# Patient Record
Sex: MALE | Race: WHITE | NOT HISPANIC OR LATINO | Employment: OTHER | URBAN - METROPOLITAN AREA
[De-identification: names, ages, dates, MRNs, and addresses within clinical notes are randomized per-mention and may not be internally consistent; named-entity substitution may affect disease eponyms.]

---

## 2017-09-15 ENCOUNTER — TRANSCRIBE ORDERS (OUTPATIENT)
Dept: LAB | Age: 82
End: 2017-09-15

## 2017-09-15 ENCOUNTER — APPOINTMENT (OUTPATIENT)
Dept: LAB | Age: 82
End: 2017-09-15
Payer: MEDICARE

## 2017-09-15 DIAGNOSIS — E78.00 PURE HYPERCHOLESTEROLEMIA: ICD-10-CM

## 2017-09-15 DIAGNOSIS — I48.20 CHRONIC ATRIAL FIBRILLATION (HCC): Primary | ICD-10-CM

## 2017-09-15 DIAGNOSIS — E03.9 UNSPECIFIED HYPOTHYROIDISM: ICD-10-CM

## 2017-09-15 DIAGNOSIS — E83.41 HYPERMAGNESEMIA: ICD-10-CM

## 2017-09-15 LAB
ALBUMIN SERPL BCP-MCNC: 3.4 G/DL (ref 3.5–5)
ALP SERPL-CCNC: 101 U/L (ref 46–116)
ALT SERPL W P-5'-P-CCNC: 17 U/L (ref 12–78)
ANION GAP SERPL CALCULATED.3IONS-SCNC: 6 MMOL/L (ref 4–13)
AST SERPL W P-5'-P-CCNC: 13 U/L (ref 5–45)
BILIRUB SERPL-MCNC: 1.4 MG/DL (ref 0.2–1)
BUN SERPL-MCNC: 22 MG/DL (ref 5–25)
CALCIUM SERPL-MCNC: 9 MG/DL (ref 8.3–10.1)
CHLORIDE SERPL-SCNC: 106 MMOL/L (ref 100–108)
CHOLEST SERPL-MCNC: 120 MG/DL (ref 50–200)
CO2 SERPL-SCNC: 27 MMOL/L (ref 21–32)
CREAT SERPL-MCNC: 1.39 MG/DL (ref 0.6–1.3)
GFR SERPL CREATININE-BSD FRML MDRD: 45 ML/MIN/1.73SQ M
GLUCOSE P FAST SERPL-MCNC: 105 MG/DL (ref 65–99)
HDLC SERPL-MCNC: 31 MG/DL (ref 40–60)
INR PPP: 2.43 (ref 0.86–1.16)
LDLC SERPL CALC-MCNC: 61 MG/DL (ref 0–100)
MAGNESIUM SERPL-MCNC: 2.5 MG/DL (ref 1.6–2.6)
POTASSIUM SERPL-SCNC: 5.1 MMOL/L (ref 3.5–5.3)
PROT SERPL-MCNC: 6.8 G/DL (ref 6.4–8.2)
PROTHROMBIN TIME: 26.7 SECONDS (ref 12.1–14.4)
SODIUM SERPL-SCNC: 139 MMOL/L (ref 136–145)
TRIGL SERPL-MCNC: 138 MG/DL
TSH SERPL DL<=0.05 MIU/L-ACNC: 3.87 UIU/ML (ref 0.36–3.74)

## 2017-09-15 PROCEDURE — 80061 LIPID PANEL: CPT

## 2017-09-15 PROCEDURE — 85610 PROTHROMBIN TIME: CPT

## 2017-09-15 PROCEDURE — 83735 ASSAY OF MAGNESIUM: CPT

## 2017-09-15 PROCEDURE — 84443 ASSAY THYROID STIM HORMONE: CPT

## 2017-09-15 PROCEDURE — 80053 COMPREHEN METABOLIC PANEL: CPT

## 2017-09-15 PROCEDURE — 36415 COLL VENOUS BLD VENIPUNCTURE: CPT

## 2017-10-02 ENCOUNTER — TRANSCRIBE ORDERS (OUTPATIENT)
Dept: LAB | Age: 82
End: 2017-10-02

## 2017-10-02 ENCOUNTER — APPOINTMENT (OUTPATIENT)
Dept: LAB | Age: 82
End: 2017-10-02
Payer: MEDICARE

## 2017-10-02 DIAGNOSIS — Z79.01 LONG TERM (CURRENT) USE OF ANTICOAGULANTS: ICD-10-CM

## 2017-10-02 DIAGNOSIS — I48.91 ATRIAL FIBRILLATION, UNSPECIFIED TYPE (HCC): Primary | ICD-10-CM

## 2017-10-02 DIAGNOSIS — N17.9 ACUTE KIDNEY FAILURE, UNSPECIFIED (HCC): ICD-10-CM

## 2017-10-02 LAB
ANION GAP SERPL CALCULATED.3IONS-SCNC: 7 MMOL/L (ref 4–13)
BUN SERPL-MCNC: 19 MG/DL (ref 5–25)
CALCIUM SERPL-MCNC: 8.9 MG/DL (ref 8.3–10.1)
CHLORIDE SERPL-SCNC: 106 MMOL/L (ref 100–108)
CO2 SERPL-SCNC: 25 MMOL/L (ref 21–32)
CREAT SERPL-MCNC: 1.25 MG/DL (ref 0.6–1.3)
GFR SERPL CREATININE-BSD FRML MDRD: 51 ML/MIN/1.73SQ M
GLUCOSE P FAST SERPL-MCNC: 108 MG/DL (ref 65–99)
INR PPP: 3.21 (ref 0.86–1.16)
POTASSIUM SERPL-SCNC: 4.4 MMOL/L (ref 3.5–5.3)
PROTHROMBIN TIME: 33.3 SECONDS (ref 12.1–14.4)
SODIUM SERPL-SCNC: 138 MMOL/L (ref 136–145)

## 2017-10-02 PROCEDURE — 85610 PROTHROMBIN TIME: CPT

## 2017-10-02 PROCEDURE — 80048 BASIC METABOLIC PNL TOTAL CA: CPT

## 2017-10-02 PROCEDURE — 36415 COLL VENOUS BLD VENIPUNCTURE: CPT

## 2018-04-06 ENCOUNTER — TRANSCRIBE ORDERS (OUTPATIENT)
Dept: LAB | Age: 83
End: 2018-04-06

## 2018-04-06 ENCOUNTER — APPOINTMENT (OUTPATIENT)
Dept: LAB | Age: 83
End: 2018-04-06
Payer: MEDICARE

## 2018-04-06 DIAGNOSIS — Z79.01 LONG TERM (CURRENT) USE OF ANTICOAGULANTS: Primary | ICD-10-CM

## 2018-04-06 DIAGNOSIS — I48.0 PAROXYSMAL ATRIAL FIBRILLATION (HCC): ICD-10-CM

## 2018-04-06 LAB
INR PPP: 2.41 (ref 0.86–1.16)
PROTHROMBIN TIME: 26.5 SECONDS (ref 12.1–14.4)

## 2018-04-06 PROCEDURE — 85610 PROTHROMBIN TIME: CPT

## 2018-04-06 PROCEDURE — 36415 COLL VENOUS BLD VENIPUNCTURE: CPT

## 2018-04-18 ENCOUNTER — OFFICE VISIT (OUTPATIENT)
Dept: URGENT CARE | Age: 83
End: 2018-04-18
Payer: MEDICARE

## 2018-04-18 DIAGNOSIS — M25.542 ARTHRALGIA OF HANDS, BILATERAL: Primary | ICD-10-CM

## 2018-04-18 DIAGNOSIS — M25.541 ARTHRALGIA OF HANDS, BILATERAL: Primary | ICD-10-CM

## 2018-04-18 PROCEDURE — 99213 OFFICE O/P EST LOW 20 MIN: CPT | Performed by: FAMILY MEDICINE

## 2018-04-18 PROCEDURE — G0463 HOSPITAL OUTPT CLINIC VISIT: HCPCS | Performed by: FAMILY MEDICINE

## 2018-04-18 RX ORDER — WARFARIN SODIUM 5 MG/1
TABLET ORAL
COMMUNITY
Start: 2017-09-18 | End: 2018-10-17 | Stop reason: SDUPTHER

## 2018-04-18 RX ORDER — METOPROLOL TARTRATE 50 MG/1
75 TABLET, FILM COATED ORAL
COMMUNITY
End: 2018-10-17 | Stop reason: SDUPTHER

## 2018-04-18 RX ORDER — ASPIRIN 81 MG/1
81 TABLET, CHEWABLE ORAL DAILY
COMMUNITY
Start: 2014-06-02

## 2018-04-18 RX ORDER — AMLODIPINE BESYLATE AND ATORVASTATIN CALCIUM 2.5; 1 MG/1; MG/1
1 TABLET, FILM COATED ORAL DAILY
COMMUNITY
End: 2018-10-17 | Stop reason: SDUPTHER

## 2018-04-18 RX ORDER — LEVOTHYROXINE SODIUM 0.1 MG/1
100 TABLET ORAL DAILY
COMMUNITY
End: 2018-10-17 | Stop reason: SDUPTHER

## 2018-04-18 RX ORDER — GABAPENTIN 300 MG/1
100 CAPSULE ORAL 3 TIMES DAILY
COMMUNITY
End: 2018-07-17 | Stop reason: ALTCHOICE

## 2018-04-18 RX ORDER — BENAZEPRIL HYDROCHLORIDE 20 MG/1
20 TABLET ORAL DAILY
COMMUNITY
Start: 2014-06-02 | End: 2018-10-17 | Stop reason: SDUPTHER

## 2018-04-18 RX ORDER — METHYLPREDNISOLONE 4 MG/1
TABLET ORAL
Qty: 21 TABLET | Refills: 0 | Status: SHIPPED | OUTPATIENT
Start: 2018-04-18 | End: 2018-07-17 | Stop reason: ALTCHOICE

## 2018-04-18 NOTE — PROGRESS NOTES
[address]        NAME: Jesus Lozano is a 80 y o  male  : 1928    MRN: [id number]  DATE: 2018  TIME: 1:37 PM    Assessment and Plan   Arthralgia of hands, bilateral [M25 541, M25 542]  1  Arthralgia of hands, bilateral  Methylprednisolone 4 MG TBPK         Patient Instructions     Patient Instructions   1  Get lab results from [place] provider to take with you to rheumatologist   2  May use Tylenol, 2 tablets every 4-6 hours as needed  (max of 10 tablets per day)  3  Continue warm compresses to hands for comfort  4  Take short course of Prednisone as directed  5  Take photos of swollen hands for documentation  6  Follow with rheumatologist at upcoming appt  Chief Complaint     Chief Complaint   Patient presents with    Hand Pain     x1 month swollen hands         History of Present Illness   Jesus Lozano presents to the clinic c/o    68-year-old RHD male with bilateral hand pain in joints and wrist with swelling for the last several weeks  He has been wearing compression gloves for some comfort but still pretty miserable  He has an appointment in May to see a rheumatologist but thought may be something could be done before then to decrease his discomfort  Swelling is across MCPs and into wrists  Worse in morning  Bilateral   Takes several hours to loosen hands up  He spent winter in [facility] and went to PCP down there for evaluation of hand pain approx  1 month ago  Evidently she took X-rays of hands and also had blood work done  According to patient and significant other, no treatment was offered at that time  At some point while down there, he was started on Vit D and levothyroxine  Once back to [place] he went to his PCP here and was given Rx for Prednisone  This helped  Used some sort of joint cream, oil of marijuana  Warmth helps with pain  Aching, constant with occasional jolts  Review of Systems   Review of Systems   Constitutional: Negative  Respiratory: Negative  Cardiovascular: Negative  History of paroxysmal atrial fibrillation   Musculoskeletal: Positive for arthralgias and joint swelling  Skin: Negative  Skin changes from warfarin use          Current Medications     Long-Term Prescriptions   Medication Sig Dispense Refill    amLODIPine-atorvastatin (CADUET) 2 5-10 MG per tablet Take 1 tablet by mouth daily      aspirin 81 mg chewable tablet Chew 81 mg      benazepril (LOTENSIN) 20 mg tablet Take 10 mg by mouth      gabapentin (NEURONTIN) 300 mg capsule Take 100 mg by mouth 3 (three) times a day      levothyroxine 100 mcg tablet Take 100 mcg by mouth daily      warfarin (COUMADIN) 5 mg tablet Take 1 to 1 1/2 tablets by mouth daily or as directed   metoprolol tartrate (LOPRESSOR) 50 mg tablet Take 75 mg by mouth         Current Allergies     Allergies as of 04/18/2018 - Reviewed 04/18/2018   Allergen Reaction Noted    Penicillins Blisters             The following portions of the patient's history were reviewed and updated as appropriate: allergies, current medications, past family history, past medical history, past social history, past surgical history and problem list     Objective   There were no vitals taken for this visit  Physical Exam     Physical Exam   Constitutional: He is oriented to person, place, and time  He appears well-developed and well-nourished  No distress  HENT:   Head: Normocephalic and atraumatic  Neck: Normal range of motion  Neck supple  Cardiovascular: Normal rate and regular rhythm  Murmur heard  Pulmonary/Chest: Effort normal    Musculoskeletal:   B/l swelling MCPs  No heat, redness  NTTP  Pain with AROM wrists  LROM at [address]  Decreased  due to pain  Neurological: He is alert and oriented to person, place, and time  Skin: Skin is warm and dry  He is not diaphoretic  No erythema  Psychiatric: He has a normal mood and affect     Nursing note and vitals reviewed

## 2018-07-17 ENCOUNTER — OFFICE VISIT (OUTPATIENT)
Dept: UROLOGY | Facility: CLINIC | Age: 83
End: 2018-07-17
Payer: MEDICARE

## 2018-07-17 VITALS
WEIGHT: 240 LBS | HEIGHT: 72 IN | BODY MASS INDEX: 32.51 KG/M2 | SYSTOLIC BLOOD PRESSURE: 148 MMHG | HEART RATE: 64 BPM | DIASTOLIC BLOOD PRESSURE: 76 MMHG

## 2018-07-17 DIAGNOSIS — N31.9 NEUROMUSCULAR DYSFUNCTION OF BLADDER: ICD-10-CM

## 2018-07-17 DIAGNOSIS — C61 MALIGNANT NEOPLASM OF PROSTATE (HCC): Primary | ICD-10-CM

## 2018-07-17 LAB
SL AMB  POCT GLUCOSE, UA: NORMAL
SL AMB LEUKOCYTE ESTERASE,UA: NORMAL
SL AMB POCT BILIRUBIN,UA: NORMAL
SL AMB POCT BLOOD,UA: NORMAL
SL AMB POCT CLARITY,UA: CLEAR
SL AMB POCT COLOR,UA: YELLOW
SL AMB POCT KETONES,UA: NORMAL
SL AMB POCT NITRITE,UA: NORMAL
SL AMB POCT PH,UA: 5
SL AMB POCT SPECIFIC GRAVITY,UA: 1.01
SL AMB POCT URINE PROTEIN: NORMAL
SL AMB POCT UROBILINOGEN: NORMAL

## 2018-07-17 PROCEDURE — 81002 URINALYSIS NONAUTO W/O SCOPE: CPT | Performed by: PHYSICIAN ASSISTANT

## 2018-07-17 PROCEDURE — 99213 OFFICE O/P EST LOW 20 MIN: CPT | Performed by: PHYSICIAN ASSISTANT

## 2018-07-17 NOTE — PROGRESS NOTES
UROLOGY PROGRESS NOTE   Patient Identifiers: Vinnie Shafer (MRN [id number])  Date of Service: 7/17/2018    Subjective:     31-year-old male history of  [place] 6 prostate cancer treated with radiation   In 2007  Prior cystoscopy had showed some regrowth  His last PSA was 0 6  He complains of some urinary frequency and leakage  He gets up 3 times a night and he uses several pads per day  Urine is clear  He has no burning or hematuria  No other complaints  Patient has    See above      Objective:     VITALS:    Vitals:    07/17/18 1435   BP: 148/76   Pulse: 64     AUA SYMPTOM SCORE      Most Recent Value   AUA SYMPTOM SCORE   How often have you had a sensation of not emptying your bladder completely after you finished urinating? 5   How often have you had to urinate again less than two hours after you finished urinating? 4   How often have you found you stopped and started again several times when you urinate? 3   How often have you found it difficult to postpone urination? 1   How often have you had a weak urinary stream?  2   How often have you had to push or strain to begin urination? 1   How many times did you most typically get up to urinate from the time you went to bed at night until the time you got up in the morning?   3   Quality of Life: If you were to spend the rest of your life with your urinary condition just the way it is now, how would you feel about that?  4   AUA SYMPTOM SCORE  19            LABS:  Lab Results   Component Value Date    HGB 15 5 06/10/2015    HCT 45 3 06/10/2015    WBC 5 68 06/10/2015     (L) 06/10/2015   ]    Lab Results   Component Value Date     10/02/2017    K 4 4 10/02/2017     10/02/2017    CO2 25 10/02/2017    BUN 19 10/02/2017    CREATININE 1 25 10/02/2017    CALCIUM 8 9 10/02/2017    GLUCOSE 108 07/28/2015   ]    INPATIENT MEDS:    Current Outpatient Prescriptions:     amLODIPine-atorvastatin (CADUET) 2 5-10 MG per tablet, Take 1 tablet by mouth daily, Disp: , Rfl:     aspirin 81 mg chewable tablet, Chew 81 mg, Disp: , Rfl:     benazepril (LOTENSIN) 20 mg tablet, Take 10 mg by mouth daily  , Disp: , Rfl:     levothyroxine 100 mcg tablet, Take 100 mcg by mouth daily, Disp: , Rfl:     metoprolol tartrate (LOPRESSOR) 50 mg tablet, Take 75 mg by mouth, Disp: , Rfl:     warfarin (COUMADIN) 5 mg tablet, Take 1 to 1 1/2 tablets by mouth daily or as directed , Disp: , Rfl:     B COMPLEX VITAMINS ER PO, Take 1 tablet by mouth, Disp: , Rfl:       Physical Exam:   /76 (Patient Position: Sitting, Cuff Size: Adult)   Pulse 64   Ht 6' (1 829 m)   Wt 109 kg (240 lb)   BMI 32 55 kg/m²   GEN: no acute distress    RESP: breathing comfortably with no accessory muscle use    ABD: soft, non-tender, non-distended   INCISION:    EXT: no significant peripheral edema   (Male): Penis circumcised, phallus normal, meatus patent  Testicles descended into scrotum bilaterally without masses nor tenderness  No inguinal hernias bilaterally  DEMARCO: Prostate is not enlarged at 28 grams  The prostate is not boggy  The prostate is not tender  No nodules noted    RADIOLOGY:    none     Assessment:    1  Prostate cancer   2   Voiding dysfunction     Plan:   - continue surveillance seen 1 year for annual exam   -  -  -

## 2018-08-15 ENCOUNTER — TELEPHONE (OUTPATIENT)
Dept: UROLOGY | Facility: CLINIC | Age: 83
End: 2018-08-15

## 2018-08-15 NOTE — TELEPHONE ENCOUNTER
Leeanna Santos called and asked to speak to Richard Cedeño about something he received in the mail regarding medicine for his prostate called Prostate XR he said if he sends away for it he will get 6 soft gels he is wondering if this will help with him getting some sleep/ Please Advise pt

## 2018-10-17 ENCOUNTER — OFFICE VISIT (OUTPATIENT)
Dept: INTERNAL MEDICINE CLINIC | Age: 83
End: 2018-10-17
Payer: MEDICARE

## 2018-10-17 VITALS
WEIGHT: 246.8 LBS | TEMPERATURE: 97.2 F | HEART RATE: 61 BPM | OXYGEN SATURATION: 98 % | HEIGHT: 70 IN | DIASTOLIC BLOOD PRESSURE: 72 MMHG | BODY MASS INDEX: 35.33 KG/M2 | SYSTOLIC BLOOD PRESSURE: 140 MMHG

## 2018-10-17 DIAGNOSIS — I10 ESSENTIAL HYPERTENSION: ICD-10-CM

## 2018-10-17 DIAGNOSIS — R26.2 AMBULATORY DYSFUNCTION: ICD-10-CM

## 2018-10-17 DIAGNOSIS — Z23 NEED FOR INFLUENZA VACCINATION: Primary | ICD-10-CM

## 2018-10-17 DIAGNOSIS — I48.91 ATRIAL FIBRILLATION, UNSPECIFIED TYPE (HCC): ICD-10-CM

## 2018-10-17 DIAGNOSIS — T50.905A ADVERSE EFFECT OF DRUG, INITIAL ENCOUNTER: ICD-10-CM

## 2018-10-17 DIAGNOSIS — R26.89 BALANCE DISORDER: ICD-10-CM

## 2018-10-17 DIAGNOSIS — E55.9 VITAMIN D DEFICIENCY: ICD-10-CM

## 2018-10-17 DIAGNOSIS — E78.2 MIXED HYPERLIPIDEMIA: ICD-10-CM

## 2018-10-17 DIAGNOSIS — E03.9 HYPOTHYROIDISM, UNSPECIFIED TYPE: ICD-10-CM

## 2018-10-17 DIAGNOSIS — E83.42 HYPOMAGNESEMIA: ICD-10-CM

## 2018-10-17 PROCEDURE — 99214 OFFICE O/P EST MOD 30 MIN: CPT | Performed by: INTERNAL MEDICINE

## 2018-10-17 RX ORDER — ATORVASTATIN CALCIUM 10 MG/1
10 TABLET, FILM COATED ORAL DAILY
Qty: 90 TABLET | Refills: 1 | Status: SHIPPED | OUTPATIENT
Start: 2018-10-17 | End: 2019-04-23 | Stop reason: SDUPTHER

## 2018-10-17 RX ORDER — WARFARIN SODIUM 5 MG/1
5 TABLET ORAL
Qty: 90 TABLET | Refills: 1 | Status: SHIPPED | OUTPATIENT
Start: 2018-10-17 | End: 2019-04-23 | Stop reason: SDUPTHER

## 2018-10-17 RX ORDER — AMLODIPINE BESYLATE 2.5 MG/1
TABLET ORAL
COMMUNITY
Start: 2018-08-24 | End: 2018-10-17 | Stop reason: SDUPTHER

## 2018-10-17 RX ORDER — BENAZEPRIL HYDROCHLORIDE 20 MG/1
20 TABLET ORAL DAILY
Qty: 90 TABLET | Refills: 1 | Status: SHIPPED | OUTPATIENT
Start: 2018-10-17 | End: 2019-04-23 | Stop reason: SDUPTHER

## 2018-10-17 RX ORDER — AMLODIPINE BESYLATE 2.5 MG/1
2.5 TABLET ORAL DAILY
Qty: 90 TABLET | Refills: 1 | Status: SHIPPED | OUTPATIENT
Start: 2018-10-17 | End: 2019-04-23 | Stop reason: SDUPTHER

## 2018-10-17 RX ORDER — LEVOTHYROXINE SODIUM 0.05 MG/1
50 TABLET ORAL DAILY
Qty: 90 TABLET | Refills: 1 | Status: SHIPPED | OUTPATIENT
Start: 2018-10-17 | End: 2019-04-23 | Stop reason: SDUPTHER

## 2018-10-17 RX ORDER — METOPROLOL TARTRATE 50 MG/1
75 TABLET, FILM COATED ORAL DAILY
Qty: 90 TABLET | Refills: 1 | Status: SHIPPED | OUTPATIENT
Start: 2018-10-17 | End: 2019-04-23 | Stop reason: SDUPTHER

## 2018-10-17 RX ORDER — MULTIVIT-MIN/IRON/FOLIC ACID/K 18-600-40
1 CAPSULE ORAL DAILY
Qty: 60 CAPSULE | Refills: 3 | Status: SHIPPED | OUTPATIENT
Start: 2018-10-17

## 2018-10-17 NOTE — PROGRESS NOTES
Assessment/Plan:    1  Hypertension  His BP today at the office was slightly elevated at 140/72  He is currently taking Lopressor, Lotensin, and Caduet  He will discontinue his Caduet and use Amlodipine 2 5 mg daily  He was advised to decrease his salt and caffeine in his diet  He will have an electrolyte panel, a CMP, and magnesium level drawn prior to his next appointment  2  BLE edema  He was advised to decrease salt in his diet as that will improve his leg edema  He should also use compression stockings daily and elevate his legs as much as possible for the swelling  3  Vitamin D Deficiency  He has a vitamin D deficiency and was given 37572 IU weekly for 8 weeks in April  He was instructed to continue to take 2000 IU daily and he will have a Vitamin D level drawn at his earliest convenience  4  Hypothyroidism  He has a history of hypothyroidism  He will take 50 mcg of Levothyroxine daily  He will have his TSH and T4 level drawn prior to his next appointment  5  Hyperlipidemia  He has a history of hyperlipidemia  He is currently taking 10 mg of atorvastatin and was instructed to discontinue Rosuvastatin  He will have a lipid panel and a liver function test drawn at his earliest convenience  6  Dysphagia  He reports he has dysphagia even when he chews his food finely  He was advised to double swallow to avoid aspiration and to give him ease in swallowing  He has had tests completed with his GI specialist and follows this with him  7  Weight loss  He was advised to minimize processed foods and increase natural foods in his diet  He was encouraged to minimize his white starches, carbohydrates, and concentrated sugars from his diet  8  Constipation  He states he has some constipation for which he takes an occasional laxative  He was instructed to discontinue the laxative and instead use Miralax daily  9  Sleep Apnea  He reports he has sleep apnea and uses a CPAP machine   He states he has not cleaned his machine and was advised to do that as that might improve his rhinorrhea and with PND  10  Ambulatory dysfunction  He uses a cane for ambulation  He wanted a walker as he feels he can still lose his balance with the cane  He also has pain in his knees  He was given a script for a sitting walker as he is at a risk for falls and has difficulty ambulation  He can apply Lidocaine 4% cream topically on his knee for pain relief  11  Joint stiffness  He reports he has joint stiffness and MCP swelling  He states his left 5th digit locks up  His fists swelled bilaterally 3 times and his previous physician gave him Prednisone each time which reduced this swelling each time  He was advised to use Tylenol for pain as needed  He was advised to use a paraffin bath to relieve swelling and pain in his hands  12  Healthcare Maintenance  He had the influenza vaccine at the [facility] on 10/2/18  He is unsure of whether he had his pneumonia vaccines so he will check his medical records and update us  He will follow up with us in 3 months or after he returns from [place]  Diagnoses and all orders for this visit:    Need for influenza vaccination  -     Cancel: influenza vaccine, [id number], high-dose, PF 0 5 mL, for patients 65 yr+ (FLUZONE HIGH-DOSE)    Ambulatory dysfunction  -     Walker    Balance disorder  -     Walker    Essential hypertension  -     amLODIPine (NORVASC) 2 5 mg tablet; Take 1 tablet (2 5 mg total) by mouth daily  -     metoprolol tartrate (LOPRESSOR) 50 mg tablet; Take 1 5 tablets (75 mg total) by mouth daily  -     benazepril (LOTENSIN) 20 mg tablet; Take 1 tablet (20 mg total) by mouth daily  -     Comprehensive metabolic panel; Future  -     Electrolyte panel; Future    Mixed hyperlipidemia  -     atorvastatin (LIPITOR) 10 mg tablet; Take 1 tablet (10 mg total) by mouth daily  -     Lipid panel;  Future    Atrial fibrillation, unspecified type (HCC)  -     warfarin (COUMADIN) 5 mg tablet; Take 1 tablet (5 mg total) by mouth daily  -     Comprehensive metabolic panel; Future    Hypothyroidism, unspecified type  -     levothyroxine 50 mcg tablet; Take 1 tablet (50 mcg total) by mouth daily  -     TSH, 3rd generation with Free T4 reflex; Future  -     T4, free; Future    Hypomagnesemia  -     magnesium oxide (MAG-OX) 400 mg; Take 1 tablet (400 mg total) by mouth 2 (two) times a day  -     Magnesium; Future    Adverse effect of drug, initial encounter  -     Hepatic function panel; Future    Vitamin D deficiency  -     Vitamin D 25 hydroxy; Future  -     Cholecalciferol (VITAMIN D) 2000 units CAPS; Take 1 capsule (2,000 Units total) by mouth daily    Other orders  -     Discontinue: amLODIPine (NORVASC) 2 5 mg tablet;           Subjective:      Patient ID: Dom Urbina is a 80 y o  male  Betty Woody is a 80year old male who presents today as a new patient to establish care at our practice  He has a history of hyperlipidemia, hypertension, hypothyroidism, and Vitamin B12 deficiency  He has no cartilage in his right knee and he has pain in his right hip all the way to his right knee  He states his left knee replacement surgery was completed 12 years ago  He has seen an orthopedic who has drained the knee and injected it, which he states helped him for 1-2 months  He reports he has sleep apnea and uses a CPAP machine  He reports his eyes water and he has rhinorrhea with PND  He reports he has hearing loss for which he wears hearing aids  He reports he has dysphagia even when he chews his food finely  He reports he has "stabbing sensations" in his chest occasionally which he wonders what that is  He has had open heart surgery and has Afib for which he takes Coumadin  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 Marcie Waite  He was given radiation therapy for this  He states he has a history of prostate cancer which now causes him up to 3 episodes of nocturia nightly   He follows with Dr Larisa Jalloh, urology, regularly for his urinary problems  He reports he has joint stiffness and MCP swelling  He states his left 5th digit "locks up"  His fists swelled bilaterally 3 times and his previous physician gave him Prednisone each time which reduced this swelling each time  He states he has some constipation for which he takes an occasional laxative  He reports he has gained 10 lbs in the last few months  He reports many skin lesions and skin tags  He has a history of Vitamin D deficiency  The following portions of the patient's history were reviewed and updated as appropriate: allergies, current medications, past family history, past medical history, past social history, past surgical history and problem list     Review of Systems   Constitutional: Positive for unexpected weight change (wt  gain)  Negative for diaphoresis, fatigue and fever  HENT: Positive for hearing loss, postnasal drip, rhinorrhea and trouble swallowing (caught in throat)  Negative for congestion, sinus pressure, sneezing, sore throat, tinnitus and voice change  Nosebleeds: hearing aids  Eyes: Positive for discharge  Respiratory: Positive for choking  Negative for cough, chest tightness, shortness of breath, wheezing and stridor  Cardiovascular: Positive for chest pain (had open heart surgery and AF , 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  had radiation therapy ), palpitations (af) and leg swelling  Stabbing sensation in chest   Gastrointestinal: Positive for constipation  Negative for abdominal distention, abdominal pain, anal bleeding, blood in stool, diarrhea, nausea, rectal pain and vomiting  Genitourinary: Negative for difficulty urinating, flank pain and hematuria  Nocturia   Musculoskeletal: Positive for arthralgias (needs rt  knee replacement ), back pain and gait problem (buttock pain)  Negative for neck pain and neck stiffness     Skin: Negative for color change, pallor, rash and wound    Neurological: Negative for dizziness, speech difficulty, light-headedness, numbness and headaches  Hematological: Negative for adenopathy  Psychiatric/Behavioral: Negative  Objective:      /72 (BP Location: Left arm, Patient Position: Sitting, Cuff Size: Large)   Pulse 61   Temp (!) 97 2 °F (36 2 °C) (Tympanic)   Ht 5' 10 28" (1 785 m)   Wt 112 kg (246 lb 12 8 oz)   SpO2 98%   BMI 35 13 kg/m²          Physical Exam   Constitutional: He is oriented to person, place, and time  He appears well-developed and well-nourished  No distress  HENT:   Head: Normocephalic and atraumatic  Eyes: Conjunctivae and EOM are normal  Right eye exhibits no discharge  Left eye exhibits no discharge  No scleral icterus  Neck: Normal range of motion  Neck supple  Cardiovascular: Normal rate and regular rhythm  Murmur heard  Pulmonary/Chest: Effort normal and breath sounds normal  No respiratory distress  He has no wheezes  Abdominal: Soft  Bowel sounds are normal  He exhibits no distension  There is no tenderness  There is no rebound  Musculoskeletal: He exhibits edema and tenderness  He exhibits no deformity  Neurological: He is oriented to person, place, and time  He has normal reflexes  Skin: He is not diaphoretic  There is erythema  Psychiatric: He has a normal mood and affect  His behavior is normal  Judgment and thought content normal    Vitals reviewed  Scribe Signature and Attestation:  By signing my name below, India Dunn attest that this documentation has been prepared under the direction and in the presence of Dr Geoffrey Ortiz MD  Electronically signed: Cassidy Amaya  10/17/18    Provider Attestation:  I, Dr Geoffrey Ortiz, personally performed the services described in this documentation  All medical record entries made by the scribadis were at my direction and in my presence   I have reviewed the chart and discharge instructions (if applicable) and agree that the record reflects my personal performance and is accurate and complete  Dr Lexy Little MD  10/17/18

## 2018-10-17 NOTE — PATIENT INSTRUCTIONS
1  He will discontinue his Caduet and use Amlodipine 2 5 mg daily  He was advised to decrease his salt and caffeine in his diet as that will improve his BP and his leg swelling  2  He will have an electrolyte panel, a CMP, and magnesium level drawn at his earliest convenience  3  He should also use compression stockings daily and elevate his legs as much as possible for the swelling  4  He was instructed to continue to take 2000 IU daily and he will have a Vitamin D level drawn at his earliest convenience  5  He will take 50 mcg of Levothyroxine daily  He will have his TSH and T4 level drawn at his earliest convenience  6  He is currently taking 10 mg of atorvastatin and was instructed to discontinue Rosuvastatin  7  He will have a lipid panel and a liver function test drawn at his earliest convenience  8  He was advised to double swallow to avoid aspiration and to give him ease in swallowing  9  He was advised to minimize processed foods and increase natural foods in his diet  He was encouraged to minimize his white starches, carbohydrates, and concentrated sugars from his diet  10  He was instructed to discontinue laxatives and instead use Miralax daily for his constipation  11  He was advised to clean his CPAP machine as that might improve his rhinorrhea and with PND  12  He can apply Lidocaine 4% cream topically on his knee for pain relief  13  He was advised to use Tylenol for his hand pain as needed  He was advised to use a paraffin bath to relieve swelling and pain in his hands  15  He is unsure of whether he had his pneumonia vaccines so he will check his medical records and update us  15  He will follow up with us in 3 months or after he returns from [place]

## 2018-10-24 ENCOUNTER — APPOINTMENT (OUTPATIENT)
Dept: RADIOLOGY | Age: 83
End: 2018-10-24
Payer: MEDICARE

## 2018-10-24 ENCOUNTER — OFFICE VISIT (OUTPATIENT)
Dept: INTERNAL MEDICINE CLINIC | Facility: CLINIC | Age: 83
End: 2018-10-24
Payer: MEDICARE

## 2018-10-24 VITALS
SYSTOLIC BLOOD PRESSURE: 150 MMHG | HEART RATE: 71 BPM | TEMPERATURE: 98.1 F | HEIGHT: 70 IN | BODY MASS INDEX: 35.76 KG/M2 | OXYGEN SATURATION: 97 % | WEIGHT: 249.8 LBS | DIASTOLIC BLOOD PRESSURE: 60 MMHG

## 2018-10-24 DIAGNOSIS — M54.9 OTHER ACUTE BACK PAIN: ICD-10-CM

## 2018-10-24 DIAGNOSIS — Z91.81 STATUS POST FALL: ICD-10-CM
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
## 2018-10-24 LAB
SL AMB  POCT GLUCOSE, UA: NEGATIVE
SL AMB LEUKOCYTE ESTERASE,UA: NEGATIVE
SL AMB POCT BILIRUBIN,UA: NEGATIVE
SL AMB POCT BLOOD,UA: NEGATIVE
SL AMB POCT CLARITY,UA: CLEAR
SL AMB POCT COLOR,UA: YELLOW
SL AMB POCT KETONES,UA: NEGATIVE
SL AMB POCT NITRITE,UA: NEGATIVE
SL AMB POCT PH,UA: 5.5
SL AMB POCT SPECIFIC GRAVITY,UA: 1.02
SL AMB POCT URINE PROTEIN: 30
SL AMB POCT UROBILINOGEN: 0.2

## 2018-10-24 PROCEDURE — 71111 X-RAY EXAM RIBS/CHEST4/> VWS: CPT

## 2018-10-24 PROCEDURE — 72100 X-RAY EXAM L-S SPINE 2/3 VWS: CPT

## 2018-10-24 PROCEDURE — 99214 OFFICE O/P EST MOD 30 MIN: CPT | Performed by: INTERNAL MEDICINE

## 2018-10-24 PROCEDURE — 81003 URINALYSIS AUTO W/O SCOPE: CPT | Performed by: INTERNAL MEDICINE

## 2018-10-24 RX ORDER — TRAMADOL HYDROCHLORIDE 50 MG/1
50 TABLET ORAL EVERY 6 HOURS PRN
Qty: 15 TABLET | Refills: 0 | Status: SHIPPED | OUTPATIENT
Start: 2018-10-24 | End: 2019-04-23 | Stop reason: CLARIF

## 2018-10-24 NOTE — PROGRESS NOTES
Assessment/Plan:    1  Status post fall  He reports he fell while trying to climb into a car 33 days ago  As he was stepping in to a high SUV car, he lost balance and fell backwards on his left side onto a rounded curb  He states he has a lot of pain and has trouble laying down and walking  He especially has problems walking due to more pain in the left leg  He states all his pain is in the back left side, and he gets a lot of pain He had pain when doing anterior/posterior rib compressions  He has tenderness around L3-L4  He had right SLT  His urinalysis done in the office today showed no hematuria, indicating that the kidney had no significant trauma during this fall  He will have XR of the spine and XR of the chest completed STAT  He can use Lidocaine 4% cream topically on his back for pain relief  In addition, he was given 15 tablets of tramadol 50 mg to use BID as needed for pain relief  In between the doses of the tramadol through the day, he can use Tylenol 500 mg 1 in the morning, 1 at lunch, and 2 at night, as well, for pain relief  He can also use a moist heating pad or ice therapy for pain relief  2  Healthcare Maintenance  He will follow up with us in 1 week or as needed  Diagnoses and all orders for this visit:    Fall, initial encounter  -     traMADol (ULTRAM) 50 mg tablet; Take 1 tablet (50 mg total) by mouth every 6 (six) hours as needed for moderate pain  -     XR spine lumbar 2 or 3 views injury; Future  -     XR ribs bilateral 4+ vw w pa chest; Future    Status post fall  -     traMADol (ULTRAM) 50 mg tablet; Take 1 tablet (50 mg total) by mouth every 6 (six) hours as needed for moderate pain  -     XR spine lumbar 2 or 3 views injury; Future  -     XR ribs bilateral 4+ vw w pa chest; Future    Other acute back pain  -     traMADol (ULTRAM) 50 mg tablet; Take 1 tablet (50 mg total) by mouth every 6 (six) hours as needed for moderate pain  -     XR spine lumbar 2 or 3 views injury;  Future  - XR ribs bilateral 4+ vw w pa chest; Future          Subjective:      Patient ID: Mar Carson is a 80 y o  male  Sterling Hardy is a 80year old male who presents today as a same day patient for evaluation of back pain status post fall 3 days ago  He reports he fell while trying to climb into a car  As he was stepping in to a high SUV car, he lost balance and fell backwards on his left side onto a rounded curb  He states he has  a lot of pain and has trouble laying down and walking  He especially has problems walking due to more pain in the left leg  He states all his pain is in the back left side  He reports he has some pain to touch on his RUQ  He states this is on his skin and he has followed this with a dermatologist who gave him an ointment which improved this skin condition  He reports he had constipation for 3 days, but after taking Miralax on Monday, he had a bowel movement  He states he will be flying at the end of next week to [place]  The following portions of the patient's history were reviewed and updated as appropriate: allergies, current medications, past family history, past medical history, past social history, past surgical history and problem list     Review of Systems   HENT: Positive for postnasal drip and rhinorrhea  Eyes: Negative for visual disturbance (no change)  Cardiovascular: Negative for chest pain and palpitations  Gastrointestinal: Positive for abdominal distention and constipation  Negative for abdominal pain, blood in stool and diarrhea  Skin sensitivity  Of skin  Around abdomen   Genitourinary: Negative for difficulty urinating, hematuria and urgency  Musculoskeletal: Positive for back pain and gait problem  Skin:        Dry skin   Neurological: Negative for dizziness, light-headedness, numbness and headaches  Fall from unusual  positioning   Psychiatric/Behavioral: Negative            Objective:      /60 (BP Location: Left arm, Patient Position: Sitting, Cuff Size: Adult)   Pulse 71   Temp 98 1 °F (36 7 °C) (Oral)   Ht 5' 10 28" (1 785 m)   Wt 113 kg (249 lb 12 8 oz)   SpO2 97%   BMI 35 56 kg/m²          Physical Exam   Constitutional: He appears well-developed and well-nourished  No distress  HENT:   Head: Normocephalic and atraumatic  Eyes: Conjunctivae and EOM are normal  Left eye exhibits no discharge  No scleral icterus  Neck: Normal range of motion  Neck supple  Cardiovascular: Normal rate and regular rhythm  Murmur heard  Pulmonary/Chest: He is in respiratory distress  He has no wheezes  He has no rales  He exhibits tenderness  Pain on lateral compression of the chest and also in ap pressure  Decreased BS   Abdominal: He exhibits distension  He exhibits no mass  There is tenderness  There is no rebound and no guarding  Musculoskeletal: He exhibits edema  He exhibits no deformity  Local back tenderness  L4-L5  Positive st   Leg tenderness  DTR'S are absent   Neurological: He displays abnormal reflex (reduced)  Skin: Skin is dry  No rash noted  He is not diaphoretic  Bruise noted on back   Psychiatric: He has a normal mood and affect  His behavior is normal  Judgment and thought content normal          Scribe Signature and Attestation:  By signing my name below, Aba Shrestha attest that this documentation has been prepared under the direction and in the presence of Dr Sanjay Aquino MD  Electronically signed: Cassidy Hollis  10/24/18    Provider Attestation:  I, Dr Sanjay Aquino, personally performed the services described in this documentation  All medical record entries made by the scribe were at my direction and in my presence  I have reviewed the chart and discharge instructions (if applicable) and agree that the record reflects my personal performance and is accurate and complete  Dr Sanjay Aquino MD  10/24/18

## 2018-10-24 NOTE — PATIENT INSTRUCTIONS
1  He will have XR of the spine and XR of the chest completed STAT  2  He can use Lidocaine 4% cream topically on his back for pain relief  3  In addition, he was given 15 tablets of tramadol 50 mg to use BID as needed for pain relief  4  In between the doses of the tramadol through the day, he can use Tylenol 500 mg 1 in the morning, 1 at lunch, and 2 at night, as well, for pain relief  5  He can also use a moist heating pad or ice therapy for pain relief  6  He will follow up with us in 1 week

## 2018-10-25 ENCOUNTER — APPOINTMENT (OUTPATIENT)
Dept: LAB | Age: 83
End: 2018-10-25
Payer: MEDICARE

## 2018-10-25 ENCOUNTER — OFFICE VISIT (OUTPATIENT)
Dept: INTERNAL MEDICINE CLINIC | Age: 83
End: 2018-10-25
Payer: MEDICARE

## 2018-10-25 VITALS
OXYGEN SATURATION: 96 % | HEART RATE: 57 BPM | WEIGHT: 252 LBS | DIASTOLIC BLOOD PRESSURE: 70 MMHG | TEMPERATURE: 97.3 F | HEIGHT: 70 IN | SYSTOLIC BLOOD PRESSURE: 142 MMHG | BODY MASS INDEX: 36.08 KG/M2

## 2018-10-25 DIAGNOSIS — I10 ESSENTIAL HYPERTENSION: ICD-10-CM

## 2018-10-25 DIAGNOSIS — E83.42 HYPOMAGNESEMIA: ICD-10-CM

## 2018-10-25 DIAGNOSIS — M54.5 ACUTE LEFT-SIDED LOW BACK PAIN, WITH SCIATICA PRESENCE UNSPECIFIED: Primary | ICD-10-CM

## 2018-10-25 DIAGNOSIS — I48.91 ATRIAL FIBRILLATION, UNSPECIFIED TYPE (HCC): ICD-10-CM

## 2018-10-25 DIAGNOSIS — E55.9 VITAMIN D DEFICIENCY: ICD-10-CM

## 2018-10-25 DIAGNOSIS — T50.905A ADVERSE EFFECT OF DRUG, INITIAL ENCOUNTER: ICD-10-CM

## 2018-10-25 DIAGNOSIS — E03.9 HYPOTHYROIDISM, UNSPECIFIED TYPE: ICD-10-CM

## 2018-10-25 DIAGNOSIS — E78.2 MIXED HYPERLIPIDEMIA: ICD-10-CM

## 2018-10-25 LAB
25(OH)D3 SERPL-MCNC: 27.7 NG/ML (ref 30–100)
ALBUMIN SERPL BCP-MCNC: 3.4 G/DL (ref 3.5–5)
ALP SERPL-CCNC: 103 U/L (ref 46–116)
ALT SERPL W P-5'-P-CCNC: 19 U/L (ref 12–78)
ANION GAP SERPL CALCULATED.3IONS-SCNC: 8 MMOL/L (ref 4–13)
AST SERPL W P-5'-P-CCNC: 16 U/L (ref 5–45)
BILIRUB DIRECT SERPL-MCNC: 0.34 MG/DL (ref 0–0.2)
BILIRUB SERPL-MCNC: 1.63 MG/DL (ref 0.2–1)
BUN SERPL-MCNC: 23 MG/DL (ref 5–25)
CALCIUM SERPL-MCNC: 8.9 MG/DL (ref 8.3–10.1)
CHLORIDE SERPL-SCNC: 106 MMOL/L (ref 100–108)
CHOLEST SERPL-MCNC: 116 MG/DL (ref 50–200)
CO2 SERPL-SCNC: 25 MMOL/L (ref 21–32)
CREAT SERPL-MCNC: 1.35 MG/DL (ref 0.6–1.3)
GFR SERPL CREATININE-BSD FRML MDRD: 46 ML/MIN/1.73SQ M
GLUCOSE P FAST SERPL-MCNC: 109 MG/DL (ref 65–99)
HDLC SERPL-MCNC: 34 MG/DL (ref 40–60)
LDLC SERPL CALC-MCNC: 62 MG/DL (ref 0–100)
MAGNESIUM SERPL-MCNC: 2.3 MG/DL (ref 1.6–2.6)
NONHDLC SERPL-MCNC: 82 MG/DL
POTASSIUM SERPL-SCNC: 4.3 MMOL/L (ref 3.5–5.3)
PROT SERPL-MCNC: 6.6 G/DL (ref 6.4–8.2)
SODIUM SERPL-SCNC: 139 MMOL/L (ref 136–145)
T4 FREE SERPL-MCNC: 1.09 NG/DL (ref 0.76–1.46)
TRIGL SERPL-MCNC: 102 MG/DL
TSH SERPL DL<=0.05 MIU/L-ACNC: 5.65 UIU/ML (ref 0.36–3.74)

## 2018-10-25 PROCEDURE — 82306 VITAMIN D 25 HYDROXY: CPT

## 2018-10-25 PROCEDURE — 80053 COMPREHEN METABOLIC PANEL: CPT

## 2018-10-25 PROCEDURE — 83735 ASSAY OF MAGNESIUM: CPT

## 2018-10-25 PROCEDURE — 99212 OFFICE O/P EST SF 10 MIN: CPT | Performed by: INTERNAL MEDICINE

## 2018-10-25 PROCEDURE — 84439 ASSAY OF FREE THYROXINE: CPT

## 2018-10-25 PROCEDURE — 82248 BILIRUBIN DIRECT: CPT

## 2018-10-25 PROCEDURE — 80061 LIPID PANEL: CPT

## 2018-10-25 PROCEDURE — 36415 COLL VENOUS BLD VENIPUNCTURE: CPT

## 2018-10-25 PROCEDURE — 84443 ASSAY THYROID STIM HORMONE: CPT

## 2018-10-25 RX ORDER — PREDNISONE 10 MG/1
10 TABLET ORAL 2 TIMES DAILY WITH MEALS
Qty: 14 TABLET | Refills: 0 | Status: SHIPPED | OUTPATIENT
Start: 2018-10-25 | End: 2018-10-25 | Stop reason: ALTCHOICE

## 2018-10-25 NOTE — PATIENT INSTRUCTIONS
1  Put heat on areas of complaint for 15-20 minutes every few hours, do not go to bed with the heating pad  2  Should you experience increase in pain level, there is a prescription for Prednisone 20 mg to take for seven days that will help to reduce inflammation  Should you take this, watch your white starch and sugar intake as steroids can increase your sugar levels  3  You may split Tramadol tablets in 1/2 if the full dose causes dizziness

## 2018-10-25 NOTE — PROGRESS NOTES
Assessment/Plan:    1  Status post fall  Patient fell over a month ago while attempting to get into his sons car  He injured his left side and back  X-ray ribs on 10/24/18 did not show any pulmonary disease or evidence of rib fracture  X-ray spine on 10/24/18 did not reveal any fracture  X-ray images were viewed together with the patient and his wife in detail  Should his pain flare, I will provide him with Prednisone 20 mg tablets for 7 days  If he is doing better, he need not take the steroids  He was made aware that taking steroids can increase his sugar levels and he is to watch his white starch and sugar intake should he take this  He also has the Tramadol 50 mg and Lidocaine 4% topical cream that he is able to use PRN  He is able to break Tramadol in half should he be concerned about dizziness  Diagnoses and all orders for this visit:    Acute left-sided low back pain, with sciatica presence unspecified  -     Discontinue: predniSONE 10 mg tablet; Take 1 tablet (10 mg total) by mouth 2 (two) times a day with meals for 7 days (Patient not taking: Reported on 10/25/2018 )          Subjective:      Patient ID: Nerissa Og is a 80 y o  male  Dee Siegel is a 80year old male who presents today as a follow-up to discuss results of his X-ray ribs and spine status post fall  Patient fell over a month ago while trying to get into his son's car, and was seen in the office on 10/24/18  X-ray ribs on 10/24/18 did not show any pulmonary disease or evidence of rib fracture  X-ray spine on 10/24/18 did not reveal any fracture  He has been using the Tramadol and Lidocaine 4% topical cream as needed  He reports that he only took one Tramadol and that he is feeling much better compared to last week  He did experience one episode of lightheadedness after use of Tramadol  Should he and his wife plan to go on a flight to [place], instructions were given           The following portions of the patient's history were reviewed and updated as appropriate: allergies, current medications, past family history, past medical history, past social history, past surgical history and problem list     Review of Systems   Constitutional: Positive for activity change  Negative for diaphoresis, fatigue and fever  HENT: Negative for congestion  Respiratory: Negative for chest tightness  Gastrointestinal: Negative for abdominal distention, abdominal pain and blood in stool  Genitourinary: Negative for difficulty urinating  Musculoskeletal: Positive for arthralgias, back pain (much improved) and gait problem  Skin: Negative  Neurological: Positive for light-headedness (mild)  Negative for dizziness (mild and short lived with tramadol), tremors, seizures, syncope, speech difficulty and headaches  Psychiatric/Behavioral: Negative for agitation, confusion, decreased concentration, dysphoric mood and hallucinations  The patient is not hyperactive  Objective:      /70 (BP Location: Left arm, Patient Position: Sitting, Cuff Size: Standard)   Pulse 57   Temp (!) 97 3 °F (36 3 °C) (Tympanic)   Ht 5' 10 28" (1 785 m)   Wt 114 kg (252 lb)   SpO2 96%   BMI 35 87 kg/m²          Physical Exam   Constitutional: He is oriented to person, place, and time  Cardiovascular: Normal rate and regular rhythm  Abdominal: He exhibits no distension  Musculoskeletal: He exhibits edema  Still local tenderness in ribs and lower back   Neurological: He is alert and oriented to person, place, and time  Attestation:   By signing my name below, Jose Galeazzi, attest that this documentation has been prepared under the direction and in the presence of Bia Lunsford MD  Electronically Signed: Cassidy Ayala  10/25/18     I, Bia Lunsford, personally performed the services described in this documentation  All medical record entries made by the larissaibadis were at my direction and in my presence   I have reviewed the chart and discharge instructions and agree that the record reflects my personal performance and is accurate and complete    Yanci Villalobos MD  10/25/18

## 2018-11-20 ENCOUNTER — TELEPHONE (OUTPATIENT)
Dept: INTERNAL MEDICINE CLINIC | Age: 83
End: 2018-11-20

## 2018-11-20 NOTE — TELEPHONE ENCOUNTER
Patient lives in [place] for 6 months every year  He wants us to fax his medical records only from the past 6 months to his physician in [place]  Please include x-ray reports and labs  Dr Charlton Dancer Sugar  FAX:  [phone]  He has an appointment on 12/11/2018  Call his cell if any questions

## 2019-04-23 ENCOUNTER — OFFICE VISIT (OUTPATIENT)
Dept: INTERNAL MEDICINE CLINIC | Age: 84
End: 2019-04-23
Payer: MEDICARE

## 2019-04-23 VITALS
OXYGEN SATURATION: 98 % | HEIGHT: 70 IN | TEMPERATURE: 97.7 F | DIASTOLIC BLOOD PRESSURE: 48 MMHG | BODY MASS INDEX: 34.22 KG/M2 | WEIGHT: 239 LBS | SYSTOLIC BLOOD PRESSURE: 100 MMHG | HEART RATE: 53 BPM

## 2019-04-23 DIAGNOSIS — J34.89 RHINORRHEA: ICD-10-CM

## 2019-04-23 DIAGNOSIS — G47.33 OSA (OBSTRUCTIVE SLEEP APNEA): ICD-10-CM

## 2019-04-23 DIAGNOSIS — Z23 ENCOUNTER FOR IMMUNIZATION: ICD-10-CM

## 2019-04-23 DIAGNOSIS — I48.91 ATRIAL FIBRILLATION, UNSPECIFIED TYPE (HCC): ICD-10-CM

## 2019-04-23 DIAGNOSIS — H04.203 WATERY EYES: ICD-10-CM

## 2019-04-23 DIAGNOSIS — Z23 NEED FOR TDAP VACCINATION: ICD-10-CM

## 2019-04-23 DIAGNOSIS — Z88.9 H/O SEASONAL ALLERGIES: Primary | ICD-10-CM

## 2019-04-23 DIAGNOSIS — E78.2 MIXED HYPERLIPIDEMIA: ICD-10-CM

## 2019-04-23 DIAGNOSIS — I10 ESSENTIAL HYPERTENSION: ICD-10-CM

## 2019-04-23 DIAGNOSIS — E03.9 HYPOTHYROIDISM, UNSPECIFIED TYPE: ICD-10-CM

## 2019-04-23 PROCEDURE — 99214 OFFICE O/P EST MOD 30 MIN: CPT | Performed by: INTERNAL MEDICINE

## 2019-04-23 RX ORDER — ATORVASTATIN CALCIUM 10 MG/1
10 TABLET, FILM COATED ORAL DAILY
Qty: 90 TABLET | Refills: 1 | Status: SHIPPED | OUTPATIENT
Start: 2019-04-23 | End: 2019-10-22 | Stop reason: SDUPTHER

## 2019-04-23 RX ORDER — LEVOTHYROXINE SODIUM 0.05 MG/1
50 TABLET ORAL DAILY
Qty: 90 TABLET | Refills: 1 | Status: SHIPPED | OUTPATIENT
Start: 2019-04-23 | End: 2019-09-24

## 2019-04-23 RX ORDER — BENAZEPRIL HYDROCHLORIDE 10 MG/1
10 TABLET ORAL DAILY
Qty: 90 TABLET | Refills: 1 | Status: SHIPPED | OUTPATIENT
Start: 2019-04-23 | End: 2019-10-22 | Stop reason: SDUPTHER

## 2019-04-23 RX ORDER — AMLODIPINE BESYLATE 5 MG/1
5 TABLET ORAL DAILY
Qty: 90 TABLET | Refills: 1 | Status: SHIPPED | OUTPATIENT
Start: 2019-04-23 | End: 2019-10-22 | Stop reason: SDUPTHER

## 2019-04-23 RX ORDER — METOPROLOL TARTRATE 50 MG/1
75 TABLET, FILM COATED ORAL DAILY
Qty: 90 TABLET | Refills: 1 | Status: SHIPPED | OUTPATIENT
Start: 2019-04-23 | End: 2019-04-23

## 2019-04-23 RX ORDER — CETIRIZINE HYDROCHLORIDE 5 MG/1
5 TABLET ORAL DAILY
Qty: 90 TABLET | Refills: 1 | Status: CANCELLED | OUTPATIENT
Start: 2019-04-23

## 2019-04-23 RX ORDER — WARFARIN SODIUM 5 MG/1
5 TABLET ORAL
Qty: 90 TABLET | Refills: 1 | Status: SHIPPED | OUTPATIENT
Start: 2019-04-23 | End: 2019-10-22 | Stop reason: SDUPTHER

## 2019-04-23 RX ORDER — BENAZEPRIL HYDROCHLORIDE 20 MG/1
20 TABLET ORAL DAILY
Qty: 90 TABLET | Refills: 1 | Status: SHIPPED | OUTPATIENT
Start: 2019-04-23 | End: 2019-04-23 | Stop reason: SDUPTHER

## 2019-04-26 ENCOUNTER — TELEPHONE (OUTPATIENT)
Dept: INTERNAL MEDICINE CLINIC | Age: 84
End: 2019-04-26

## 2019-05-04 ENCOUNTER — TELEPHONE (OUTPATIENT)
Dept: INTERNAL MEDICINE CLINIC | Age: 84
End: 2019-05-04

## 2019-05-21 ENCOUNTER — TELEPHONE (OUTPATIENT)
Dept: INTERNAL MEDICINE CLINIC | Age: 84
End: 2019-05-21

## 2019-05-21 ENCOUNTER — OFFICE VISIT (OUTPATIENT)
Dept: INTERNAL MEDICINE CLINIC | Age: 84
End: 2019-05-21
Payer: MEDICARE

## 2019-05-21 ENCOUNTER — APPOINTMENT (OUTPATIENT)
Dept: RADIOLOGY | Age: 84
End: 2019-05-21
Payer: MEDICARE

## 2019-05-21 VITALS
OXYGEN SATURATION: 98 % | SYSTOLIC BLOOD PRESSURE: 114 MMHG | DIASTOLIC BLOOD PRESSURE: 72 MMHG | TEMPERATURE: 97.7 F | HEART RATE: 76 BPM

## 2019-05-21 DIAGNOSIS — J40 BRONCHITIS: Primary | ICD-10-CM

## 2019-05-21 DIAGNOSIS — C61 MALIGNANT NEOPLASM OF PROSTATE (HCC): ICD-10-CM

## 2019-05-21 DIAGNOSIS — J40 BRONCHITIS: ICD-10-CM

## 2019-05-21 PROCEDURE — 71046 X-RAY EXAM CHEST 2 VIEWS: CPT

## 2019-05-21 PROCEDURE — 99214 OFFICE O/P EST MOD 30 MIN: CPT | Performed by: INTERNAL MEDICINE

## 2019-05-21 RX ORDER — FLUTICASONE PROPIONATE 110 UG/1
2 AEROSOL, METERED RESPIRATORY (INHALATION) 2 TIMES DAILY
Qty: 1 INHALER | Refills: 1 | Status: SHIPPED | OUTPATIENT
Start: 2019-05-21 | End: 2019-05-24 | Stop reason: CLARIF

## 2019-05-21 RX ORDER — FEXOFENADINE HCL 180 MG/1
180 TABLET ORAL DAILY
Qty: 15 TABLET | Refills: 0 | Status: SHIPPED | OUTPATIENT
Start: 2019-05-21

## 2019-05-21 RX ORDER — FLUTICASONE PROPIONATE 50 MCG
1 SPRAY, SUSPENSION (ML) NASAL DAILY
Qty: 1 BOTTLE | Refills: 0 | Status: SHIPPED | OUTPATIENT
Start: 2019-05-21

## 2019-05-21 RX ORDER — BENZONATATE 100 MG/1
100 CAPSULE ORAL 3 TIMES DAILY PRN
Qty: 20 CAPSULE | Refills: 0 | OUTPATIENT
Start: 2019-05-21 | End: 2019-05-22 | Stop reason: SDUPTHER

## 2019-05-22 ENCOUNTER — TELEPHONE (OUTPATIENT)
Dept: INTERNAL MEDICINE CLINIC | Age: 84
End: 2019-05-22

## 2019-05-22 DIAGNOSIS — J40 BRONCHITIS: ICD-10-CM

## 2019-05-22 RX ORDER — BENZONATATE 100 MG/1
100 CAPSULE ORAL 3 TIMES DAILY PRN
Qty: 30 CAPSULE | Refills: 0 | Status: SHIPPED | OUTPATIENT
Start: 2019-05-22 | End: 2019-06-03 | Stop reason: SDUPTHER

## 2019-05-24 ENCOUNTER — TELEPHONE (OUTPATIENT)
Dept: INTERNAL MEDICINE CLINIC | Age: 84
End: 2019-05-24

## 2019-05-24 DIAGNOSIS — J40 BRONCHITIS: Primary | ICD-10-CM

## 2019-06-03 ENCOUNTER — OFFICE VISIT (OUTPATIENT)
Dept: INTERNAL MEDICINE CLINIC | Age: 84
End: 2019-06-03
Payer: MEDICARE

## 2019-06-03 VITALS
WEIGHT: 232.2 LBS | SYSTOLIC BLOOD PRESSURE: 116 MMHG | TEMPERATURE: 97.9 F | OXYGEN SATURATION: 97 % | BODY MASS INDEX: 32.87 KG/M2 | HEART RATE: 62 BPM | DIASTOLIC BLOOD PRESSURE: 62 MMHG

## 2019-06-03 DIAGNOSIS — J40 BRONCHITIS: ICD-10-CM

## 2019-06-03 DIAGNOSIS — C61 MALIGNANT NEOPLASM OF PROSTATE (HCC): Primary | ICD-10-CM

## 2019-06-03 DIAGNOSIS — I10 ESSENTIAL HYPERTENSION: ICD-10-CM

## 2019-06-03 PROBLEM — I48.91 A-FIB (HCC): Status: ACTIVE | Noted: 2018-05-22

## 2019-06-03 PROCEDURE — 94640 AIRWAY INHALATION TREATMENT: CPT

## 2019-06-03 PROCEDURE — 99214 OFFICE O/P EST MOD 30 MIN: CPT | Performed by: NURSE PRACTITIONER

## 2019-06-03 RX ORDER — PREDNISONE 20 MG/1
40 TABLET ORAL DAILY
Qty: 10 TABLET | Refills: 0 | Status: SHIPPED | OUTPATIENT
Start: 2019-06-03 | End: 2019-06-08

## 2019-06-03 RX ORDER — AZITHROMYCIN 250 MG/1
TABLET, FILM COATED ORAL
Qty: 6 TABLET | Refills: 0 | Status: SHIPPED | OUTPATIENT
Start: 2019-06-03 | End: 2019-06-07

## 2019-06-03 RX ORDER — LEVALBUTEROL INHALATION SOLUTION 1.25 MG/3ML
1.25 SOLUTION RESPIRATORY (INHALATION) ONCE
Status: COMPLETED | OUTPATIENT
Start: 2019-06-03 | End: 2019-06-03

## 2019-06-03 RX ORDER — BENZONATATE 100 MG/1
100 CAPSULE ORAL 3 TIMES DAILY PRN
Qty: 30 CAPSULE | Refills: 0 | Status: SHIPPED | OUTPATIENT
Start: 2019-06-03 | End: 2019-06-19 | Stop reason: ALTCHOICE

## 2019-06-03 RX ADMIN — LEVALBUTEROL INHALATION SOLUTION 1.25 MG: 1.25 SOLUTION RESPIRATORY (INHALATION) at 15:31

## 2019-06-11 ENCOUNTER — OFFICE VISIT (OUTPATIENT)
Dept: INTERNAL MEDICINE CLINIC | Age: 84
End: 2019-06-11
Payer: MEDICARE

## 2019-06-11 VITALS
WEIGHT: 235.2 LBS | TEMPERATURE: 99.5 F | BODY MASS INDEX: 33.3 KG/M2 | HEART RATE: 64 BPM | SYSTOLIC BLOOD PRESSURE: 90 MMHG | DIASTOLIC BLOOD PRESSURE: 46 MMHG | OXYGEN SATURATION: 98 %

## 2019-06-11 DIAGNOSIS — E86.0 DEHYDRATION: ICD-10-CM

## 2019-06-11 DIAGNOSIS — G93.3 POSTVIRAL FATIGUE SYNDROME: Primary | ICD-10-CM

## 2019-06-11 PROCEDURE — 99213 OFFICE O/P EST LOW 20 MIN: CPT | Performed by: INTERNAL MEDICINE

## 2019-06-13 ENCOUNTER — TELEPHONE (OUTPATIENT)
Dept: INTERNAL MEDICINE CLINIC | Age: 84
End: 2019-06-13

## 2019-06-13 NOTE — TELEPHONE ENCOUNTER
lmom for patient to return call to see how he is feeling   Labs done on 6/11/19 shows cr improved from 2 to 1 67  Pt is instructed in message to f/u in our office next week with myself or dr walker

## 2019-06-14 ENCOUNTER — TELEPHONE (OUTPATIENT)
Dept: INTERNAL MEDICINE CLINIC | Age: 84
End: 2019-06-14

## 2019-06-19 ENCOUNTER — OFFICE VISIT (OUTPATIENT)
Dept: INTERNAL MEDICINE CLINIC | Age: 84
End: 2019-06-19
Payer: MEDICARE

## 2019-06-19 VITALS
TEMPERATURE: 96.8 F | OXYGEN SATURATION: 98 % | SYSTOLIC BLOOD PRESSURE: 118 MMHG | HEART RATE: 62 BPM | BODY MASS INDEX: 33.89 KG/M2 | DIASTOLIC BLOOD PRESSURE: 56 MMHG | WEIGHT: 239.4 LBS

## 2019-06-19 DIAGNOSIS — N18.4 CHRONIC RENAL FAILURE, STAGE 4 (SEVERE) (HCC): Primary | ICD-10-CM

## 2019-06-19 PROCEDURE — 99213 OFFICE O/P EST LOW 20 MIN: CPT | Performed by: INTERNAL MEDICINE

## 2019-07-22 ENCOUNTER — OFFICE VISIT (OUTPATIENT)
Dept: UROLOGY | Facility: CLINIC | Age: 84
End: 2019-07-22
Payer: MEDICARE

## 2019-07-22 VITALS
BODY MASS INDEX: 32.37 KG/M2 | WEIGHT: 239 LBS | DIASTOLIC BLOOD PRESSURE: 64 MMHG | HEART RATE: 72 BPM | SYSTOLIC BLOOD PRESSURE: 118 MMHG | HEIGHT: 72 IN

## 2019-07-22 DIAGNOSIS — N32.81 OAB (OVERACTIVE BLADDER): Primary | ICD-10-CM

## 2019-07-22 PROCEDURE — 99213 OFFICE O/P EST LOW 20 MIN: CPT | Performed by: PHYSICIAN ASSISTANT

## 2019-07-22 RX ORDER — OXYBUTYNIN CHLORIDE 5 MG/1
5 TABLET, EXTENDED RELEASE ORAL DAILY
Qty: 30 TABLET | Refills: 10 | Status: SHIPPED | OUTPATIENT
Start: 2019-07-22 | End: 2020-01-01

## 2019-07-23 ENCOUNTER — DOCUMENTATION (OUTPATIENT)
Dept: INTERNAL MEDICINE CLINIC | Facility: CLINIC | Age: 84
End: 2019-07-23

## 2019-07-24 ENCOUNTER — DOCUMENTATION (OUTPATIENT)
Dept: INTERNAL MEDICINE CLINIC | Facility: CLINIC | Age: 84
End: 2019-07-24

## 2019-07-30 ENCOUNTER — TELEPHONE (OUTPATIENT)
Dept: INTERNAL MEDICINE CLINIC | Age: 84
End: 2019-07-30

## 2019-07-30 DIAGNOSIS — R06.02 SOB (SHORTNESS OF BREATH): Primary | ICD-10-CM

## 2019-07-30 NOTE — TELEPHONE ENCOUNTER
Patient is returning your phone call from today  Patient wants to speak with you regarding the pulmonologist that he has the appointment with today      Please call him back at the mobile phone

## 2019-07-30 NOTE — PROGRESS NOTES
Patient continues to be quite short of breath he does see Dr Erica Miguel for his sleep apnea however he is not available to have a recheck until September  To to try and get him into another pulmonologist in a short period of time

## 2019-07-31 ENCOUNTER — OFFICE VISIT (OUTPATIENT)
Dept: INTERNAL MEDICINE CLINIC | Age: 84
End: 2019-07-31
Payer: MEDICARE

## 2019-07-31 VITALS
WEIGHT: 234.4 LBS | OXYGEN SATURATION: 96 % | HEART RATE: 65 BPM | TEMPERATURE: 97.6 F | DIASTOLIC BLOOD PRESSURE: 76 MMHG | SYSTOLIC BLOOD PRESSURE: 122 MMHG | BODY MASS INDEX: 31.79 KG/M2

## 2019-07-31 DIAGNOSIS — E86.0 DEHYDRATION: ICD-10-CM

## 2019-07-31 DIAGNOSIS — I10 ESSENTIAL HYPERTENSION: ICD-10-CM

## 2019-07-31 DIAGNOSIS — R19.7 DIARRHEA, UNSPECIFIED TYPE: ICD-10-CM

## 2019-07-31 DIAGNOSIS — R06.02 SOB (SHORTNESS OF BREATH): Primary | ICD-10-CM

## 2019-07-31 DIAGNOSIS — J45.20 MILD INTERMITTENT ASTHMA WITHOUT COMPLICATION: ICD-10-CM

## 2019-07-31 DIAGNOSIS — E11.9 TYPE 2 DIABETES MELLITUS WITHOUT COMPLICATION, WITHOUT LONG-TERM CURRENT USE OF INSULIN (HCC): ICD-10-CM

## 2019-07-31 DIAGNOSIS — I48.0 PAROXYSMAL ATRIAL FIBRILLATION (HCC): ICD-10-CM

## 2019-07-31 PROCEDURE — 99214 OFFICE O/P EST MOD 30 MIN: CPT | Performed by: INTERNAL MEDICINE

## 2019-07-31 NOTE — PATIENT INSTRUCTIONS
1  For your diarrhea you going to stay on a brat diet for 48 hours,  Banana, rice, peeled apple, and toast with only jelly  You also need to drink plenty of fluid in particular Pedialyte  For the diarrhea continue with Kaopectate 1 tbsp after each bowel movement  This will turn your stools dark    Hold your didtropan  for the next 2 days  If her diarrhea continues despite the above recommendation you will need to get stool studies for infections  For now you need to get blood test to make sure you're not overly dehydrated  For your shortness of breath you should get a chest x-ray and you will be seen by Pulmonary

## 2019-07-31 NOTE — PROGRESS NOTES
BMI Counseling: Body mass index is 31 79 kg/m²  Discussed the patient's BMI with him  The BMI is above average  BMI counseling and education was provided to the patient  Nutrition recommendations include reducing portion sizes  Assessment/Plan:    1  Profuse watery diarrhea:  Patient was placed on a brat diet and is to continue Kaopectate 1 tbsp after IV bowel movement  He was also advised that that he should hold his ditropan  If he continues with diarrhea he will need to get stool studies for C diff and Giardia and pathogens  2  Shortness of breath patient will need to get a chest x-ray and pulmonary evaluation  3  Health night maintenance patient is due for his flu shot in October the patient does not need hepatitis B vaccinations  There are no diagnoses linked to this encounter  Subjective:      Patient ID: Keyonna Bowling is a 80 y o  male  The patient is a 57-year-old white male who presents with 3 days of explosive watery type diarrhea  This followed eating at the [facility] in bath  No blood or mucus there were some form particles today is diarrhea  No specific abdominal pain other than with the bowel movements no rectal pain overall  Feels weak  The following portions of the patient's history were reviewed and updated as appropriate: allergies, current medications, past family history, past medical history, past social history, past surgical history and problem list     Review of Systems   Constitutional: Positive for chills, fatigue and unexpected weight change  Negative for appetite change, diaphoresis and fever  HENT: Negative for congestion, drooling, ear discharge, ear pain, hearing loss, mouth sores, nosebleeds, postnasal drip, rhinorrhea, sinus pressure and sinus pain  Eyes: Negative  Negative for photophobia, pain and redness  Respiratory: Positive for cough and shortness of breath  Negative for choking, chest tightness and wheezing           To see a pulmonary specialist for his shortness of breath   Cardiovascular: Negative for chest pain, palpitations and leg swelling  Gastrointestinal: Positive for diarrhea  Negative for abdominal distention, abdominal pain, anal bleeding, blood in stool, constipation, nausea, rectal pain and vomiting  Endocrine: Positive for polyuria  Genitourinary: Positive for frequency  Musculoskeletal: Positive for arthralgias (Right knee) and back pain  Negative for gait problem, joint swelling and myalgias  Skin: Negative  Neurological: Positive for light-headedness  Negative for dizziness  Balance is off he is always unsure of his balance   Hematological: Bruises/bleeds easily  Psychiatric/Behavioral: Positive for dysphoric mood  Negative for decreased concentration  The patient is nervous/anxious  Objective:      /76 (BP Location: Left arm, Patient Position: Sitting, Cuff Size: Adult)   Pulse 65   Temp 97 6 °F (36 4 °C) (Tympanic)   Wt 106 kg (234 lb 6 4 oz)   SpO2 96%   BMI 31 79 kg/m²          Physical Exam   Constitutional: He is oriented to person, place, and time  He appears well-developed and well-nourished  No distress  Eyes: Conjunctivae and EOM are normal  Right eye exhibits no discharge  Left eye exhibits no discharge  No scleral icterus  Neck: Normal range of motion  Neck supple  No JVD present  No thyromegaly present  Cardiovascular: Normal rate and regular rhythm  Pulmonary/Chest: Effort normal and breath sounds normal    Occasional crepitance  i at the bases   Abdominal: Soft  Bowel sounds are normal    Patient's abdomen is soft bowel sounds are active  He has diffuse but mild tenderness throughout the whole upper abdomen there is no rebound  Musculoskeletal: He exhibits edema (trace)  He exhibits no tenderness or deformity  Neurological: He is alert and oriented to person, place, and time  Coordination abnormal    Skin: Skin is warm  He is not diaphoretic     Nursing note and vitals reviewed

## 2019-08-01 NOTE — TELEPHONE ENCOUNTER
The office is looking into where they can put him in as a new patient and will call us back or the patient

## 2019-08-05 NOTE — PROGRESS NOTES
UROLOGY PROGRESS NOTE   Patient Identifiers: Thi Jones (MRN [id number])  Date of Service: 8/5/2019    Subjective:     12-year-old man with history of Dilip 6 prostate cancer he had radiation in 2007  Prior cystoscopy showed some  Regrowth  His last PSA was 0 6  He complains of some urgency and frequency  He does get up several times per night and uses several pads per day  Patient has    See above      Objective:     VITALS:    Vitals:    07/22/19 1436   BP: 118/64   Pulse: 72     AUA SYMPTOM SCORE      Most Recent Value   AUA SYMPTOM SCORE   How often have you had a sensation of not emptying your bladder completely after you finished urinating? 5   How often have you had to urinate again less than two hours after you finished urinating? 4   How often have you found you stopped and started again several times when you urinate? 5   How often have you found it difficult to postpone urination? 5   How often have you had a weak urinary stream?  4   How often have you had to push or strain to begin urination? 5   How many times did you most typically get up to urinate from the time you went to bed at night until the time you got up in the morning?   4   Quality of Life: If you were to spend the rest of your life with your urinary condition just the way it is now, how would you feel about that?  3   AUA SYMPTOM SCORE  32            LABS:  Lab Results   Component Value Date    HGB 15 5 06/10/2015    HCT 45 3 06/10/2015    WBC 5 68 06/10/2015     (L) 06/10/2015   ]    Lab Results   Component Value Date     07/28/2015    K 4 3 10/25/2018     10/25/2018    CO2 25 10/25/2018    BUN 23 10/25/2018    CREATININE 1 35 (H) 10/25/2018    CALCIUM 8 9 10/25/2018    GLUCOSE 108 07/28/2015   ]        INPATIENT MEDS:    Current Outpatient Medications:     amLODIPine (NORVASC) 5 mg tablet, Take 1 tablet (5 mg total) by mouth daily, Disp: 90 tablet, Rfl: 1    aspirin 81 mg chewable tablet, Chew 81 mg daily  , Disp: , Rfl:     atorvastatin (LIPITOR) 10 mg tablet, Take 1 tablet (10 mg total) by mouth daily, Disp: 90 tablet, Rfl: 1    B COMPLEX VITAMINS ER PO, Take 1 tablet by mouth daily  , Disp: , Rfl:     benazepril (LOTENSIN) 10 mg tablet, Take 1 tablet (10 mg total) by mouth daily, Disp: 90 tablet, Rfl: 1    budesonide (PULMICORT) 90 MCG/ACT inhaler, Inhale 1 puff 2 (two) times a day, Disp: 1 Inhaler, Rfl: 1    Cholecalciferol (VITAMIN D) 2000 units CAPS, Take 1 capsule (2,000 Units total) by mouth daily, Disp: 60 capsule, Rfl: 3    fexofenadine (ALLEGRA) 180 MG tablet, Take 1 tablet (180 mg total) by mouth daily, Disp: 15 tablet, Rfl: 0    fluticasone (FLONASE) 50 mcg/act nasal spray, 1 spray into each nostril daily, Disp: 1 Bottle, Rfl: 0    levothyroxine 50 mcg tablet, Take 1 tablet (50 mcg total) by mouth daily, Disp: 90 tablet, Rfl: 1    metoprolol tartrate (LOPRESSOR) 25 mg tablet, Take 1 tablet (25 mg total) by mouth 2 (two) times a day, Disp: 180 tablet, Rfl: 1    Probiotic Product (PROBIOTIC DAILY PO), Take 1 tablet by mouth daily, Disp: , Rfl:     warfarin (COUMADIN) 5 mg tablet, Take 1 tablet (5 mg total) by mouth daily, Disp: 90 tablet, Rfl: 1    oxybutynin (DITROPAN-XL) 5 mg 24 hr tablet, Take 1 tablet (5 mg total) by mouth daily, Disp: 30 tablet, Rfl: 10      Physical Exam:   /64 (BP Location: Left arm, Patient Position: Sitting, Cuff Size: Adult)   Pulse 72   Ht 6' (1 829 m)   Wt 108 kg (239 lb)   BMI 32 41 kg/m²   GEN: no acute distress    RESP: breathing comfortably with no accessory muscle use    ABD: soft, non-tender, non-distended   INCISION:    EXT: no significant peripheral edema   (Male): Penis uncircumcised, phallus normal, meatus patent  Testicles descended into scrotum bilaterally without masses nor tenderness  No inguinal hernias bilaterally  DEMARCO: Prostate is not enlarged at 30 grams  The prostate is not boggy  The prostate is not tender    No nodules noted      RADIOLOGY:    none     Assessment:    1  Prostate cancer   2   Urinary frequency     Plan:   - follow-up in 1 year for his annual exam  - he is not interested in any further treatment at this time  -  -

## 2019-08-06 ENCOUNTER — TELEPHONE (OUTPATIENT)
Dept: PULMONOLOGY | Facility: CLINIC | Age: 84
End: 2019-08-06

## 2019-08-06 NOTE — TELEPHONE ENCOUNTER
Called and left message to patient to contact our office so he can reschedule new patient appointment he missed on 8/5/19 with Dr Mirta Tena  He can be schedule next available  If there are any issues with dates he can speak to our Nurse

## 2019-08-07 ENCOUNTER — OFFICE VISIT (OUTPATIENT)
Dept: INTERNAL MEDICINE CLINIC | Age: 84
End: 2019-08-07
Payer: MEDICARE

## 2019-08-07 VITALS
DIASTOLIC BLOOD PRESSURE: 62 MMHG | HEART RATE: 62 BPM | BODY MASS INDEX: 32.28 KG/M2 | TEMPERATURE: 97.6 F | OXYGEN SATURATION: 98 % | WEIGHT: 238 LBS | SYSTOLIC BLOOD PRESSURE: 112 MMHG

## 2019-08-07 DIAGNOSIS — I10 ESSENTIAL HYPERTENSION: ICD-10-CM

## 2019-08-07 DIAGNOSIS — I48.0 PAROXYSMAL ATRIAL FIBRILLATION (HCC): ICD-10-CM

## 2019-08-07 DIAGNOSIS — J45.20 MILD INTERMITTENT ASTHMA WITHOUT COMPLICATION: Primary | ICD-10-CM

## 2019-08-07 DIAGNOSIS — K59.01 SLOW TRANSIT CONSTIPATION: ICD-10-CM

## 2019-08-07 PROCEDURE — 99214 OFFICE O/P EST MOD 30 MIN: CPT | Performed by: PHYSICIAN ASSISTANT

## 2019-08-07 RX ORDER — ALBUTEROL SULFATE 90 UG/1
2 AEROSOL, METERED RESPIRATORY (INHALATION) EVERY 6 HOURS PRN
Qty: 1 INHALER | Refills: 5 | Status: SHIPPED | OUTPATIENT
Start: 2019-08-07

## 2019-08-07 NOTE — PROGRESS NOTES
Assessment/Plan:         Diagnoses and all orders for this visit:    Mild intermittent asthma without complication  Comments:  trial proair, f/u with pulmonary as scheduled   Orders:  -     albuterol (PROVENTIL HFA,VENTOLIN HFA) 90 mcg/act inhaler; Inhale 2 puffs every 6 (six) hours as needed for wheezing or shortness of breath    Essential hypertension  Comments:  well controlled     Paroxysmal atrial fibrillation (HCC)  Comments:  pt has been compliant with coumadin, wife helps him to maintain proper dosing     Slow transit constipation  Comments:  increase fiber in diet  metamucil or fiber one           Subjective:      Patient ID: Sera Abrams is a 80 y o  male  C/o sore on left ear - noticed a few days ago  Pt reports he just noticed this and is unaware how long it was there  His wife did not notice this either    F/u for diarrhea - pt had for 4 days then no BM x 4 days and now had one yesterday   Which he had to strain slightly for         The following portions of the patient's history were reviewed and updated as appropriate: allergies, current medications, past family history, past medical history, past social history, past surgical history and problem list     Review of Systems   Constitutional: Negative for activity change, appetite change, chills and fever  HENT: Negative for congestion, postnasal drip, rhinorrhea and sinus pain  Eyes: Negative for visual disturbance  Respiratory: Negative for cough, shortness of breath and wheezing  Cardiovascular: Negative for chest pain and palpitations  Gastrointestinal: Negative for abdominal pain, constipation, diarrhea and vomiting  Genitourinary: Negative for dysuria  Musculoskeletal: Positive for back pain  Negative for arthralgias, joint swelling and myalgias  Skin: Negative for rash  Ear scab L side    Allergic/Immunologic: Negative for environmental allergies     Neurological: Negative for dizziness, light-headedness and headaches  Hematological: Does not bruise/bleed easily           Past Medical History:   Diagnosis Date    [facility] fibrillation ([facility] )     Hypertension     Melanoma ([facility] )     Prostate cancer ([facility] )     Sleep apnea          Current Outpatient Medications:     amLODIPine (NORVASC) 5 mg tablet, Take 1 tablet (5 mg total) by mouth daily, Disp: 90 tablet, Rfl: 1    aspirin 81 mg chewable tablet, Chew 81 mg daily  , Disp: , Rfl:     atorvastatin (LIPITOR) 10 mg tablet, Take 1 tablet (10 mg total) by mouth daily, Disp: 90 tablet, Rfl: 1    B COMPLEX VITAMINS ER PO, Take 1 tablet by mouth daily  , Disp: , Rfl:     benazepril (LOTENSIN) 10 mg tablet, Take 1 tablet (10 mg total) by mouth daily, Disp: 90 tablet, Rfl: 1    budesonide (PULMICORT) 90 MCG/ACT inhaler, Inhale 1 puff 2 (two) times a day, Disp: 1 Inhaler, Rfl: 1    Cholecalciferol (VITAMIN D) 2000 units CAPS, Take 1 capsule (2,000 Units total) by mouth daily, Disp: 60 capsule, Rfl: 3    fexofenadine (ALLEGRA) 180 MG tablet, Take 1 tablet (180 mg total) by mouth daily, Disp: 15 tablet, Rfl: 0    fluticasone (FLONASE) 50 mcg/act nasal spray, 1 spray into each nostril daily, Disp: 1 Bottle, Rfl: 0    levothyroxine 50 mcg tablet, Take 1 tablet (50 mcg total) by mouth daily, Disp: 90 tablet, Rfl: 1    metoprolol tartrate (LOPRESSOR) 25 mg tablet, Take 1 tablet (25 mg total) by mouth 2 (two) times a day, Disp: 180 tablet, Rfl: 1    oxybutynin (DITROPAN-XL) 5 mg 24 hr tablet, Take 1 tablet (5 mg total) by mouth daily, Disp: 30 tablet, Rfl: 10    Probiotic Product (PROBIOTIC DAILY PO), Take 1 tablet by mouth daily, Disp: , Rfl:     warfarin (COUMADIN) 5 mg tablet, Take 1 tablet (5 mg total) by mouth daily, Disp: 90 tablet, Rfl: 1    albuterol (PROVENTIL HFA,VENTOLIN HFA) 90 mcg/act inhaler, Inhale 2 puffs every 6 (six) hours as needed for wheezing or shortness of breath, Disp: 1 Inhaler, Rfl: 5    Allergies   Allergen Reactions    Penicillins Blisters Social History   Past Surgical History:   Procedure Laterality Date    CARDIAC SURGERY  1996    CHOLECYSTECTOMY      REPLACEMENT TOTAL KNEE Left     SKIN CANCER EXCISION       Family History   Problem Relation Age of Onset    Alzheimer's disease Mother     Lung disease Father        Objective:  /62 (BP Location: Left arm, Patient Position: Sitting, Cuff Size: Adult)   Pulse 62   Temp 97 6 °F (36 4 °C) (Tympanic)   Wt 108 kg (238 lb)   SpO2 98%   BMI 32 28 kg/m²        Physical Exam   Constitutional: He is oriented to person, place, and time  He appears well-developed and well-nourished  No distress  HENT:   Head: Normocephalic and atraumatic  Right Ear: External ear normal    Left Ear: External ear normal    Nose: Nose normal    Mouth/Throat: Oropharynx is clear and moist    Eyes: Pupils are equal, round, and reactive to light  Conjunctivae and EOM are normal  Right eye exhibits no discharge  Left eye exhibits no discharge  Neck: Normal range of motion  Neck supple  Cardiovascular: Normal rate, regular rhythm and normal heart sounds  Pulmonary/Chest: Effort normal and breath sounds normal  No stridor  No respiratory distress  He has no wheezes  Musculoskeletal: Normal range of motion  He exhibits no edema or deformity  Neurological: He is alert and oriented to person, place, and time  Skin: Skin is warm and dry  No rash noted  He is not diaphoretic  No erythema  L ear small eschar present, no bleeding    Vitals reviewed

## 2019-08-16 ENCOUNTER — TELEPHONE (OUTPATIENT)
Dept: UROLOGY | Facility: MEDICAL CENTER | Age: 84
End: 2019-08-16

## 2019-08-16 NOTE — TELEPHONE ENCOUNTER
Patient of Dr Anum Quinn from [facility] calling to see if fax was received on patients supplies  Percell Frames to be on the safe side to refax order to [phone]  Kahtryn Quinn requesting call back to confirm receipt

## 2019-08-19 ENCOUNTER — OFFICE VISIT (OUTPATIENT)
Dept: PULMONOLOGY | Facility: CLINIC | Age: 84
End: 2019-08-19
Payer: MEDICARE

## 2019-08-19 VITALS
HEIGHT: 72 IN | SYSTOLIC BLOOD PRESSURE: 118 MMHG | BODY MASS INDEX: 32.64 KG/M2 | DIASTOLIC BLOOD PRESSURE: 62 MMHG | WEIGHT: 241 LBS | HEART RATE: 61 BPM | OXYGEN SATURATION: 93 % | TEMPERATURE: 97.3 F | RESPIRATION RATE: 18 BRPM

## 2019-08-19 DIAGNOSIS — R06.00 DOE (DYSPNEA ON EXERTION): Primary | ICD-10-CM

## 2019-08-19 DIAGNOSIS — R13.10 DYSPHAGIA: ICD-10-CM

## 2019-08-19 DIAGNOSIS — G47.33 OSA (OBSTRUCTIVE SLEEP APNEA): ICD-10-CM

## 2019-08-19 PROCEDURE — 94010 BREATHING CAPACITY TEST: CPT | Performed by: INTERNAL MEDICINE

## 2019-08-19 PROCEDURE — 99205 OFFICE O/P NEW HI 60 MIN: CPT | Performed by: INTERNAL MEDICINE

## 2019-08-19 PROCEDURE — 94618 PULMONARY STRESS TESTING: CPT | Performed by: INTERNAL MEDICINE

## 2019-08-19 NOTE — PROGRESS NOTES
Pulmonary Consultation   Merlin Doty 80 y o  male MRN: [id number]    Encounter: [id number]      Reason for consultation:   Dyspnea on exertion    Requesting physician:   Rg Pat MD      Impressions:   · Dyspnea on exertion  · Dysphagia  · Cough  · Obesity  Recommendations:  · Spirometry  · 6 minutes walk test   · Speech evaluation and video barium study  · Regular walks to improve stamina  · Follow-up in 4 weeks  Discussion:  The patient's dyspnea on exertion is mainly due to deconditioning  His spirometry was unremarkable  Fortunately he did not desaturate on the 6 minutes walk test   My main concern is that he has intermittent aspiration  I have referred him to speech for evaluation and also ordered a video barium study  I have encouraged him to take regular walks to improve stamina  I will see him after 4 weeks in a follow-up visit  History of Present Illness   HPI:  Sallie Holt is a 80 y o  male who is here for evaluation of dyspnea on exertion  The patient has difficulty remembering symptoms however his wife was helping with the history  A year ago his breathing was much better than now  It has progressively worsened but has stabilized for the last 6 months  His main problem is shortness of breath on exertion  Upon questioning the wife the patient seemed to be sedentary since a year ago  He has a cough  He has frequent coughing spells when he eats or drinks  Multiple occasions when he coughs up food particles  It is more so when he is taking his pills and it is not uncommon for him to take his pills and then cough it up and then swallowed again  He denies any chest pain  Denies any wheezing or chest tightness  No fever or chills  Review of systems:  12 point review of systems was completed and was otherwise negative except as listed in HPI        Historical Information   Past Medical History:   Diagnosis Date    [facility]     Hypertension     [facility]     Prostate cancer ([facility]     Sleep apnea      Past Surgical History:   Procedure Laterality Date    CARDIAC SURGERY  1996    CHOLECYSTECTOMY      REPLACEMENT TOTAL KNEE Left     SKIN CANCER EXCISION       Family History   Problem Relation Age of Onset    Alzheimer's disease Mother     Lung disease Father        Family History:  Not contributory  Social History:  The patient is  and lives with his wife  He was born and raised in [place]  He has about 10 pack year smoking history and quit many years ago  Meds/Allergies   No current facility-administered medications for this visit  (Not in a hospital admission)  Allergies   Allergen Reactions    Penicillins Blisters       Vitals: Blood pressure 118/62, pulse 61, temperature (!) 97 3 °F (36 3 °C), temperature source Tympanic, resp  rate 18, height 6' (1 829 m), weight 109 kg (241 lb), SpO2 93 %  ,      Physical exam:        Head/eyes:    Normocephalic, without obvious abnormality, atraumatic,         PERRL, extraocular muscles intact, no scleral icterus    Nose:   Nares normal, septum midline, mucosa normal, no drainage    or sinus tenderness   Throat:   Moist mucous membranes, no thrush   Neck:   Supple, trachea midline, no adenopathy; no carotid    bruit or JVD   Lungs:     Clear breath sounds  No wheezing or rhonchi  Heart:    Regular rate and rhythm, S1 and S2 normal, no murmur, rub   or gallop   Abdomen:     Soft, non-tender, bowel sounds active all four quadrants,     no masses, no organomegaly   Extremities:   Extremities normal, atraumatic, no cyanosis or edema   Skin:   Warm, dry, turgor normal, no rashes or lesions   Neurologic:   CNII-XII intact, normal strength, non-focal             Imaging and other studies: I have personally reviewed pertinent films in PACS chest x-ray from May of this year is reviewed on the [facility] system and showed no acute pulmonary disease      6 minutes walk test was done and the patient started with a heart rate of 60 beats per minute and O2 saturation 98%  After 4 minutes the heart rate 1 out to 79 beats per minute and the O2 saturation was 94%  Mid that point the patient had significant hip pain and had to stop  He walked 126 meters  Spirometry was done in the office  Patient had difficulty with testing  It showed normal airflow on vital capacity          Lauren Agudelo MD

## 2019-08-23 NOTE — TELEPHONE ENCOUNTER
I called Jackie Charles back and informed her I spoke with someone today around 12:30 who was re-faxing over the orders because we have still not received them  Jackie Charles is again fax it to [phone]  Awaiting fax

## 2019-08-23 NOTE — TELEPHONE ENCOUNTER
I spoke with Dr Myriam Daniel he said he did not have any orders to be signed for patients DME from [facility]  I called [employer] and requested they fax it to our direct fax at [phone]  I am awaiting the fax  Once it is received I will have Dr Myriam Daniel sign and fax over immediately

## 2019-08-23 NOTE — TELEPHONE ENCOUNTER
Jackie Charles from [facility] is calling to ask if fax was received and what the turn around time was  Also stated the pay attention to section 3 which is important

## 2019-08-26 ENCOUNTER — TELEPHONE (OUTPATIENT)
Dept: UROLOGY | Facility: CLINIC | Age: 84
End: 2019-08-26

## 2019-08-26 NOTE — TELEPHONE ENCOUNTER
Regarding: FW: No message from patient    This man has requested urologic supplies  He needs an appointment with Lizeth Garcia for follow-up to determine the necessity of those supplies this should be done as soon is Lizeth Garcia has an opening  ----- Message -----  From: Mayank Rascon RN  Sent: 8/26/2019   8:49 AM EDT  To: Maninder Mccracken MD  Subject: FW: No message from patient                      Patient was seen by Lizeth Garcia on 7/22/2019 and advised to follow up next year  You would like to see him again?  ----- Message -----  From: Maninder Mccracken MD  Sent: 8/26/2019   8:48 AM EDT  To: Mayank Rascon RN  Subject: No message from patient                          ----- Message from Maninder Mccracken MD sent at 8/26/2019  8:48 AM EDT -----   Please advise patient that he requires an office visit to discuss need for a urinary incontinence supply  This encounter does not contain a message from the patient

## 2019-08-26 NOTE — TELEPHONE ENCOUNTER
Fax was received  Form was given to Dr Baron Guaman to fill out and sign  Once that is complete I will fax to [facility]

## 2019-09-09 ENCOUNTER — HOSPITAL ENCOUNTER (OUTPATIENT)
Dept: RADIOLOGY | Facility: HOSPITAL | Age: 84
Discharge: HOME | End: 2019-09-09
Attending: INTERNAL MEDICINE
Payer: MEDICARE

## 2019-09-09 DIAGNOSIS — R13.10 DYSPHAGIA: ICD-10-CM

## 2019-09-09 PROCEDURE — G8998 SWALLOW D/C STATUS: HCPCS

## 2019-09-09 PROCEDURE — G8996 SWALLOW CURRENT STATUS: HCPCS

## 2019-09-09 PROCEDURE — 74230 X-RAY XM SWLNG FUNCJ C+: CPT

## 2019-09-09 PROCEDURE — G8997 SWALLOW GOAL STATUS: HCPCS

## 2019-09-09 PROCEDURE — 92611 MOTION FLUOROSCOPY/SWALLOW: CPT

## 2019-09-09 NOTE — PROCEDURES
Video Swallow Study      Patient Name: Doris Gipson  [id number] Date: 9/9/2019        Past Medical History  Past Medical History:   Diagnosis Date    [facility]     Hypertension     [facility]anoma [facility]     Prostate cancer ([facility]     Sleep apnea         Past Surgical History  Past Surgical History:   Procedure Laterality Date    CARDIAC SURGERY  1996    CHOLECYSTECTOMY      REPLACEMENT TOTAL KNEE Left     SKIN CANCER EXCISION     h/o 4mm R pulmonary nodule on CT 6/16/15    Per visit w/ Dr Gwenda Merlin, pulmonary, 8/19/19  Discussion:  The patient's dyspnea on exertion is mainly due to deconditioning  His spirometry was unremarkable  Fortunately he did not desaturate on the 6 minutes walk test   My main concern is that he has intermittent aspiration  I have referred him to speech for evaluation and also ordered a video barium study  I have encouraged him to take regular walks to improve stamina  I will see him after 4 weeks in a follow-up visit  History of Present Illness         HPI:  Doris Gipson is a 80 y o  male who is here for evaluation of dyspnea on exertion  The patient has difficulty remembering symptoms however his wife was helping with the history  A year ago his breathing was much better than now  It has progressively worsened but has stabilized for the last 6 months  His main problem is shortness of breath on exertion  Upon questioning the wife the patient seemed to be sedentary since a year ago  He has a cough  He has frequent coughing spells when he eats or drinks  Multiple occasions when he coughs up food particles  It is more so when he is taking his pills and it is not uncommon for him to take his pills and then cough it up and then swallowed again  He denies any chest pain  Denies any wheezing or chest tightness  No fever or chills  Video Barium Swallow Study    Summary:  Oral stage was WNL for bolus formation and transfer  Mastication was WNL  The pt chews throroughly so that PO is swallowed easier  Pharyngeal stage was mild  The epiglottis was small and C shaped and inverted inconsistently  There was residue in the valleculae throughout the study, including the barium pill  This cleared w/ consecutive sips of thin liquid  The pt also has osteophytes at C 3-4 which are contributing to his dysphagia  Laryngeal excursion was adequate  Pharyngeal constriction was incomplete due to bulge at C3-4  There was a mild cricopharyngeal prominence w/ mild retropulsion  No aspiration visualized this study  Cannot r/o trace aspiration  Pt states he has had swallowing problems for years  Esophageal stage was WNL per gross screen  Images are on PACS for review  Recommendations:  Diet: avoid dense or stick foods  Liquids: thin  Meds:suggested he let them soften in water or applesauce prior to taking to see if that helps  Also suggested trialing taking in puree or pudding or following w/ consecutive sips of liquid  (better epiglottic inversion w/ consecutive sips)  Strategies: secondary swallow, alternate w/ liquids  Min neck flexion may have altered the anatomy slightly to allow for epiglottic inversion  Upright position  F/u ST tx: not at this time  He appears to be compensating as best as he can  Aspiration Precautions  Results reviewed with: pt  Monitor pulmonary status  Repeat VBS as necessary/if decline in status    Pt is a 89yom referred by Dr Aric Byrd for reasons indicated above  Previous VBS:  none  Current Diet:  regular   Dentition:  dentures  O2 requirement:  none  Oral mech:  Strength and ROM:  wnl  Vocal Quality/Speech:  wnl  Cognitive status:  Alert, pleasant, followed directions    Consistencies administered: Barium laden applesauce, banana, granola bar, nutrigrain bar, hard cookie, thin liquids, 13mm barium pill  Liquids were administered by cup  Pt was seated laterally at 90 degrees       Oral stage:  WNL  Lip closure:wnl  Mastication: prolonged/wnl  Bolus formation:wnl  Bolus control:wnl  Transfer:wnl (piecemeal transfer intentional at times)  Residue:none    Pharyngeal stage:  MILD  Swallow promptness:prompt  Spill to valleculae:epiglottis is very curved and material often spills over the epiglottis  Spill to pyriforms:no  Epiglottic inversion:inconsistent  Laryngeal excursion:adequate  Pharyngeal constriction:incomplete due to bulge at C3-4  Vallecular retention:All consistencies  Pill retention required multiple additional swallows of puree and thin liquids to clear  Pyriform retention:none  PPW coating: intermittent mild w/ purees and solids above the C3-4 bulge  Osteophytes: C 3-4  CP prominence: observed  Retropulsion from prominence:mild/trace  Transient penetration: thin, inconsistent  Epiglottic undercoat:no  Penetration:no gross penetration observed  Aspiration:no gross aspiration observed  Strategies:chin down may have slightly altered the anatomy to allow for better epiglottic inversion  Secondary swallows and alternating w/ liquids cleared partial residue  Screening of Esophageal stage:   WNL

## 2019-09-10 ENCOUNTER — OFFICE VISIT (OUTPATIENT)
Dept: INTERNAL MEDICINE CLINIC | Age: 84
End: 2019-09-10
Payer: MEDICARE

## 2019-09-10 VITALS
SYSTOLIC BLOOD PRESSURE: 128 MMHG | WEIGHT: 242.8 LBS | TEMPERATURE: 97.1 F | HEART RATE: 72 BPM | DIASTOLIC BLOOD PRESSURE: 82 MMHG | OXYGEN SATURATION: 97 % | BODY MASS INDEX: 32.93 KG/M2

## 2019-09-10 DIAGNOSIS — M77.51 TENDINITIS OF RIGHT ANKLE: ICD-10-CM

## 2019-09-10 DIAGNOSIS — R53.83 FATIGUE, UNSPECIFIED TYPE: ICD-10-CM

## 2019-09-10 DIAGNOSIS — E66.9 OBESITY (BMI 35.0-39.9 WITHOUT COMORBIDITY): ICD-10-CM

## 2019-09-10 DIAGNOSIS — R26.2 AMBULATORY DYSFUNCTION: Primary | ICD-10-CM

## 2019-09-10 DIAGNOSIS — M25.471 SWELLING OF ANKLE, RIGHT: ICD-10-CM

## 2019-09-10 PROCEDURE — 99214 OFFICE O/P EST MOD 30 MIN: CPT | Performed by: INTERNAL MEDICINE

## 2019-09-10 NOTE — PATIENT INSTRUCTIONS
1  Visit [facility] and visit the cane center to get fitted for a proper cane and walker  2  Complete x-ray for ankle as soon as you can  3  Complete all blood work as soon as possible  4  Use Lidocaine 4 % cream and Icy Hot with 4% lidocaine together for joint pain  5  Use Voltaren gel for the ankle at least twice a day  6  Start with a parafon bath and then use exercise ball for pain in finger joints  7  Elevate your right lower leg on a pillow every night  Apply Voltaren gel, wrap it with an 3 inch wide ACE bandage, and ice every night  8  [facility]    9  Schedule flu shot in October  10  Follow up in 3 to 4 weeks  Recommendations:  Diet: avoid dense or stick foods  Liquids: thin  Meds:suggested he let them soften in water or applesauce prior to taking to see if that helps  Also suggested trialing taking in puree or pudding or following w/ consecutive sips of liquid  (better epiglottic inversion w/ consecutive sips)  Strategies: secondary swallow, alternate w/ liquids  Min neck flexion may have altered the anatomy slightly to allow for epiglottic inversion  Upright position  F/u ST tx: not at this time  He appears to be compensating as best as he can  Aspiration Precautions  Results reviewed with: pt  Monitor pulmonary status  Repeat VBS as necessary/if decline in status        Achilles Tendinitis   WHAT YOU SHOULD KNOW:   Achilles tendinitis is inflammation of the tendon that connects your calf muscles to your heel  INSTRUCTIONS:   Medicines:  · NSAIDs help decrease swelling and pain or fever  This medicine is available with or without a doctor's order  NSAIDs can cause stomach bleeding or kidney problems in certain people  If you take blood thinner medicine, always ask your healthcare provider if NSAIDs are safe for you  Always read the medicine label and follow directions  · Take your medicine as directed  Call your healthcare provider if you think your medicine is not helping or if you have side effects  Tell him if you are allergic to any medicine  Keep a list of the medicines, vitamins, and herbs you take  Include the amounts, and when and why you take them  Bring the list or the pill bottles to follow-up visits  Carry your medicine list with you in case of an emergency  Follow up with your healthcare provider as directed: Write down your questions so you remember to ask them during your follow-up visits  Manage your Achilles tendinitis:  · Rest: The most important way to treat Achilles tendinitis is to rest  Rest decreases swelling and keeps your tendinitis from getting worse  Your healthcare provider may tell you to stop your usual training or exercise activities  He may give you other exercises to do until your Achilles tendon heals  · Ice: Ice decreases swelling and pain  Put ice in a plastic bag  Cover it with a towel  Put this on your Achilles tendon for 15 to 20 minutes, 3 to 4 times each day  Do this for 2 to 3 days or until the pain goes away  · Heat: After 2 or 3 days, you may use heat to decrease pain and stiffness  Use a hot water bottle, heating pad, whirlpool, or warm compress  To make a compress, soak a clean washcloth in warm water  Wring out the extra water and put it on your Achilles tendon 15 to 20 minutes, 3 to 4 times each day  · Physical therapy: Stretching and making muscles stronger may help decrease stress on your Achilles tendon  Physical therapists can teach you exercises and treatments to help your tendinitis heal      · Foot and ankle support: You may need to wear inserts in your shoes  You may need to wrap tape around your heel and back of the leg  You may need to wear a cast, brace, or support boot  Prevent Achilles tendinitis:  · Wear supportive shoes: Replace your running or exercise shoes before the padding or shock absorption wears out   Ask your healthcare provider which exercise shoes, heel inserts, or specially made orthotics are right for you  · Stretch before you exercise: Always warm up your muscles and stretch gently before you exercise  Do cool down exercises when you are finished  · Exercise the right way: If your tendinitis is caused by the way you exercise, ask a , , or your healthcare provider for help  They can teach you ways to train or exercise to help prevent Achilles tendinitis  Do not run or exercise on uneven or hard surfaces  Instead, run on softer surfaces such as treadmills, rubber tracks, grass, or evenly packed dirt tracks  Contact your healthcare provider if:  · Your pain and swelling increase  · You have questions about your condition or care  © 2014 [address] is for End User's use only and may not be sold, redistributed or otherwise used for commercial purposes  All illustrations and images included in CareNotes® are the copyrighted property of [employer]  or [employer]  The above information is an  only  It is not intended as medical advice for individual conditions or treatments  Talk to your doctor, nurse or pharmacist before following any medical regimen to see if it is safe and effective for you

## 2019-09-10 NOTE — PROGRESS NOTES
Assessment/Plan:    1  Results of Barium test - Oral stage was WNL for bolus formation and transfer  Mastication was WNL  The pt chews thoroughly so that PO is swallowed easier  Pharyngeal stage was mild  The epiglottis was small and C shaped and inverted inconsistently  There was residue in the vallecular throughout the study, including the barium pill  This cleared w/ consecutive sips of thin liquid  The pt also has osteophytes at C 3-4 which are contributing to his dysphagia  Laryngeal excursion was adequate  Pharyngeal constriction was incomplete due to bulge at C3-4  There was a mild cricopharyngeal prominence w/ mild retropulsion  No aspiration visualized this study  Cannot r/o trace aspiration  Pt states he has had swallowing problems for years  Esophageal stage was WNL per gross screen  Images are on PACS for review    This was discussed with the patient  2  Joint pain - He complains of joint pain  He uses Biofreeze for his pain  He was advised to try lidocaine 4 % cream we well as icy hot with 4 % lidocaine  He was advised to take a paraffin bath for his hand arthritis and follow by using a hand exercise ball  3  Achilles tendinitis - His right ankle and foot presented with edema and was warm to the touch in the office  Patient was advised to keep his leg elevated at night on a pillow  He should also wrap the leg in an ACE bandage (3 in wide)  He was also prescribed Voltaren gel and ice his ankle at night  4  BMI - It was 32 91 in the office today  Patient was advised to decrease his carbohydrates and eat smaller portions  5  Essential HTN - Patients BP was normal today in the office at 128/82  He should continue on his medications  6  Health Maintenance - Patient is due for flu shot and AWV  Follow up in 3 to 4 weeks         I have spent 30 minutes with Patient and family today in which greater than 50% of this time was spent in counseling/coordination of care regarding Diagnostic results, Risks and benefits of tx options and Intructions for management  Diagnoses and all orders for this visit:    Ambulatory dysfunction  -     Walker    Swelling of ankle, right  -     XR ankle 3+ vw right; Future  -     Sedimentation rate, automated; Future  -     Uric acid; Future    Fatigue, unspecified type  -     TSH, 3rd generation with Free T4 reflex; Future    Obesity (BMI 35 0-39 9 without comorbidity)  -     Hemoglobin A1C; Future  -     Glucose, fasting; Future    Tendinitis of right ankle  -     diclofenac sodium (VOLTAREN) 1 %; Apply 4 g topically 4 (four) times a day         Diagnoses and all orders for this visit:    Ambulatory dysfunction  -     Walker          Subjective:      Patient ID: Nannette Casey is a 80 y o  male  This is a case of 80year old white male with a PMHx of essential HTN and bronchitis presenting in the office today with swelling pain of the right ankle  He also complained of dysphagia for which he has a barium swallow test  The test was done on 8/19/19 and his pulmonologist thinks the RAMOS is mainly due to his deconditioning  He complains of multiple joint pains  In addition he has arthritis of the hands for which his fingers lock up and swell   He used Biofreeze for his pain  He was advised to try lidocaine 4 % cream we well as icy hot with 4 % lidocaine  Also, he was advised to take a paraffin bath and follow up with a hand exercise  He also has edema of his legs  He also complains of having a balance problem and he will taking classes to help improve the issue  He also complains of coughing a lot but denies any chest tightness or wheezing  Patients recent lab work was checked and discussed           The following portions of the patient's history were reviewed and updated as appropriate: allergies, current medications, past family history, past medical history, past social history, past surgical history and problem list     Review of Systems   Constitutional: Positive for unexpected weight change (elevated)  Negative for activity change, chills, diaphoresis, fatigue and fever  HENT: Positive for postnasal drip and rhinorrhea  Negative for congestion, sinus pressure, sinus pain, sneezing, sore throat, tinnitus and trouble swallowing  Eyes: Positive for visual disturbance (no change)  Negative for photophobia, pain, discharge, redness and itching  Cataracts   Respiratory: Positive for cough  Negative for chest tightness and wheezing  Cardiovascular: Positive for leg swelling  Negative for chest pain and palpitations  Gastrointestinal: Negative for abdominal distention, abdominal pain, blood in stool, constipation, diarrhea and nausea  Genitourinary: Positive for frequency and urgency  Negative for difficulty urinating  Still sig  Musculoskeletal: Positive for arthralgias and gait problem  Negative for neck pain and neck stiffness  Skin: Negative  Neurological: Positive for light-headedness  Negative for dizziness, tremors, seizures, syncope, speech difficulty, weakness, numbness and headaches  Balance problem   Hematological: Negative for adenopathy  Bruises/bleeds easily  Psychiatric/Behavioral: Negative  Objective:      /82 (BP Location: Left arm, Patient Position: Sitting, Cuff Size: Standard)   Pulse 72   Temp (!) 97 1 °F (36 2 °C) (Tympanic)   Wt 110 kg (242 lb 12 8 oz)   SpO2 97%   BMI 32 93 kg/m²          Physical Exam   Constitutional: He is oriented to person, place, and time  He appears well-developed and well-nourished  No distress  HENT:   Head: Normocephalic and atraumatic  Mouth/Throat: No oropharyngeal exudate  Eyes: EOM are normal  Right eye exhibits no discharge  Left eye exhibits no discharge  Neck: Normal range of motion  Neck supple  Cardiovascular: Normal rate and regular rhythm  No murmur heard  Pulmonary/Chest: Effort normal and breath sounds normal  No respiratory distress  He has no wheezes  He has no rales  Abdominal: Bowel sounds are normal  He exhibits no distension  There is no tenderness  There is no rebound and no guarding  Musculoskeletal: He exhibits edema and deformity  He exhibits no tenderness  Neurological: He is alert and oriented to person, place, and time  He displays normal reflexes  Skin: Skin is warm and dry  He is not diaphoretic  Attestation:   By signing my name below, Michael Pizarro, attest that this documentation has been prepared under the direction and in the presence of Denia Samuels MD  Electronically Signed: Tameka Echevarria[address]  8/27/19      I, Denia Samuels, personally performed the services described in this documentation  All medical record entries made by the scribe were at my direction and in my presence  I have reviewed the chart and discharge instructions and agree that the record reflects my personal performance and is accurate and complete   Denia Samuels MD  8/27/19

## 2019-09-11 ENCOUNTER — TELEPHONE (OUTPATIENT)
Dept: INTERNAL MEDICINE CLINIC | Age: 84
End: 2019-09-11

## 2019-09-11 DIAGNOSIS — R73.01 IMPAIRED FASTING GLUCOSE: Primary | ICD-10-CM

## 2019-09-11 LAB — HBA1C MFR BLD HPLC: 5.6 %

## 2019-09-11 NOTE — TELEPHONE ENCOUNTER
[facility] called requesting a new diagnosis code for the hemoglobin A1c test as it triggered an ABN  Please fax a new order to [phone]

## 2019-09-17 ENCOUNTER — OFFICE VISIT (OUTPATIENT)
Dept: UROLOGY | Facility: CLINIC | Age: 84
End: 2019-09-17
Payer: MEDICARE

## 2019-09-17 VITALS
DIASTOLIC BLOOD PRESSURE: 70 MMHG | HEART RATE: 63 BPM | SYSTOLIC BLOOD PRESSURE: 136 MMHG | HEIGHT: 72 IN | WEIGHT: 242 LBS | BODY MASS INDEX: 32.78 KG/M2

## 2019-09-17 DIAGNOSIS — N39.498 OTHER URINARY INCONTINENCE: Primary | ICD-10-CM

## 2019-09-17 PROCEDURE — 99213 OFFICE O/P EST LOW 20 MIN: CPT | Performed by: PHYSICIAN ASSISTANT

## 2019-09-18 ENCOUNTER — OFFICE VISIT (OUTPATIENT)
Dept: PULMONOLOGY | Facility: CLINIC | Age: 84
End: 2019-09-18
Payer: MEDICARE

## 2019-09-18 VITALS
BODY MASS INDEX: 33.1 KG/M2 | RESPIRATION RATE: 18 BRPM | OXYGEN SATURATION: 95 % | SYSTOLIC BLOOD PRESSURE: 140 MMHG | WEIGHT: 244.4 LBS | HEART RATE: 64 BPM | DIASTOLIC BLOOD PRESSURE: 68 MMHG | HEIGHT: 72 IN | TEMPERATURE: 98.4 F

## 2019-09-18 DIAGNOSIS — R05.9 COUGH: Primary | ICD-10-CM

## 2019-09-18 PROCEDURE — 99213 OFFICE O/P EST LOW 20 MIN: CPT | Performed by: INTERNAL MEDICINE

## 2019-09-18 NOTE — PROGRESS NOTES
Office Progress Note - Pulmonary    Alyssa Demetria Doty 80 y o  male MRN: [id number]    Encounter: [id number]      Assessment:   Cough   Intermittent aspiration   Dyspnea on exertion   Obesity  Plan:    Aspiration precautions   Regular walks when possible to improve stamina   Weight loss   Follow-up as needed  Discussion:   The patient's cough is due to intermittent aspiration  I have reiterated the recommendations made by the speech therapist regarding aspiration precautions  I have reassured him that he does not have underlying lung disease  I have recommended that he should try to lose weight as well as exercise when possible  I have not scheduled him for another appointment however I will be happy to see him in the future if the need arises  Subjective: The patient is here for a follow-up visit  He has occasional cough  Still has dyspnea on exertion  Denies pain  Review of systems:  A 12 point system review is done and aside from what is stated above the rest of the review of systems is negative  Family history and social history are reviewed  Medications list is reviewed  Vitals: Blood pressure 140/68, pulse 64, temperature 98 4 °F (36 9 °C), temperature source Tympanic, resp  rate 18, height 6' (1 829 m), weight 111 kg (244 lb 6 4 oz), SpO2 95 %  ,     Physical Exam  Gen: Awake, alert, oriented x 3, no acute distress  HEENT: Mucous membranes moist, no oral lesions, no thrush  NECK: No accessory muscle use, JVP not elevated  Cardiac: Regular, single S1, single S2, no murmurs, no rubs, no gallops  Lungs:  Decreased breath sounds  No wheezing  Abdomen: normoactive bowel sounds, soft nontender, nondistended, no rebound or rigidity, no guarding  Extremities: no cyanosis, no clubbing, no edema  Neuro:  Grossly nonfocal   Skin:  No rash  VBS report is reviewed and it showed that the patient pill retention and evidence of intermittent aspiration

## 2019-09-23 ENCOUNTER — TELEPHONE (OUTPATIENT)
Dept: UROLOGY | Facility: MEDICAL CENTER | Age: 84
End: 2019-09-23

## 2019-09-23 NOTE — TELEPHONE ENCOUNTER
Patient of Dr Addison Graham seen at [facility] office  Received call from [facility] regarding need for paperwork    Kate Alvares can be reached at [phone]  Thank you

## 2019-09-24 DIAGNOSIS — E03.9 HYPOTHYROIDISM, UNSPECIFIED TYPE: Primary | ICD-10-CM

## 2019-09-24 RX ORDER — LEVOTHYROXINE SODIUM 0.07 MG/1
75 TABLET ORAL DAILY
Qty: 90 TABLET | Refills: 1 | Status: SHIPPED | OUTPATIENT
Start: 2019-09-24 | End: 2020-01-01

## 2019-09-24 NOTE — TELEPHONE ENCOUNTER
Lisette from [employer] calling to advise that under section 3, the statement that has been circled needs to be included on the office notes      She can be reached at [phone]

## 2019-09-25 NOTE — TELEPHONE ENCOUNTER
Fax requires a Signature with title and date  Please fax to [phone]      Geovanna Denton is asking for a call [phone] to discuss

## 2019-10-22 ENCOUNTER — OFFICE VISIT (OUTPATIENT)
Dept: INTERNAL MEDICINE CLINIC | Age: 84
End: 2019-10-22
Payer: MEDICARE

## 2019-10-22 VITALS
HEIGHT: 72 IN | WEIGHT: 241 LBS | DIASTOLIC BLOOD PRESSURE: 60 MMHG | BODY MASS INDEX: 32.64 KG/M2 | SYSTOLIC BLOOD PRESSURE: 138 MMHG | OXYGEN SATURATION: 98 % | TEMPERATURE: 97.1 F | HEART RATE: 64 BPM

## 2019-10-22 DIAGNOSIS — E78.2 MIXED HYPERLIPIDEMIA: ICD-10-CM

## 2019-10-22 DIAGNOSIS — M54.50 CHRONIC RIGHT-SIDED LOW BACK PAIN WITHOUT SCIATICA: Primary | ICD-10-CM

## 2019-10-22 DIAGNOSIS — Z23 NEED FOR TDAP VACCINATION: ICD-10-CM

## 2019-10-22 DIAGNOSIS — E03.9 HYPOTHYROIDISM, UNSPECIFIED TYPE: ICD-10-CM

## 2019-10-22 DIAGNOSIS — I10 ESSENTIAL HYPERTENSION: ICD-10-CM

## 2019-10-22 DIAGNOSIS — I48.91 ATRIAL FIBRILLATION, UNSPECIFIED TYPE (HCC): ICD-10-CM

## 2019-10-22 DIAGNOSIS — G89.29 CHRONIC RIGHT-SIDED LOW BACK PAIN WITHOUT SCIATICA: Primary | ICD-10-CM

## 2019-10-22 PROCEDURE — 90471 IMMUNIZATION ADMIN: CPT

## 2019-10-22 PROCEDURE — 99214 OFFICE O/P EST MOD 30 MIN: CPT | Performed by: INTERNAL MEDICINE

## 2019-10-22 PROCEDURE — 90715 TDAP VACCINE 7 YRS/> IM: CPT

## 2019-10-22 RX ORDER — BENAZEPRIL HYDROCHLORIDE 10 MG/1
10 TABLET ORAL DAILY
Qty: 90 TABLET | Refills: 1 | Status: SHIPPED | OUTPATIENT
Start: 2019-10-22 | End: 2020-01-01

## 2019-10-22 RX ORDER — ATORVASTATIN CALCIUM 10 MG/1
10 TABLET, FILM COATED ORAL DAILY
Qty: 90 TABLET | Refills: 1 | Status: SHIPPED | OUTPATIENT
Start: 2019-10-22 | End: 2020-01-01

## 2019-10-22 RX ORDER — AMLODIPINE BESYLATE 5 MG/1
5 TABLET ORAL DAILY
Qty: 90 TABLET | Refills: 1 | Status: SHIPPED | OUTPATIENT
Start: 2019-10-22 | End: 2020-01-01

## 2019-10-22 RX ORDER — WARFARIN SODIUM 5 MG/1
5 TABLET ORAL
Qty: 90 TABLET | Refills: 1 | Status: SHIPPED | OUTPATIENT
Start: 2019-10-22 | End: 2020-01-01

## 2019-10-22 NOTE — PROGRESS NOTES
Chief Complaint   Patient presents with    Follow-up     ambulatory dysfunction- labs 9/24/19       Assessment/Plan:    1  Back and hip pain - He has been experiencing back and hip pain  Patient states he has been going to physical therapy for this and it has helped improve his symptoms  He will continue to follow up with PT for his pain  2  Lab results from 9/11/19 -Patients results were all within normal range  His recent TSH is pending  These results were discussed with patient  3  Hypothyroidism - Patient TSH level was slightly elevated from 9/11/19  I changed his dosage of synthroid to 75 mcg  He will need to complete repeat TSH before next visit  4  Arthritis - Patient complains of continuation of pain in his hand joints  He was advised to try a paraffin bath  Patient also has typical pain in his thumb from osteoarthritis  He states his gloves for arthritis have helped  5  Health Maintenance - Patient is due for Tdap and AWV  He will follow up in 6 months  BMI Counseling: Body mass index is 32 69 kg/m²  The BMI is above normal  Nutrition recommendations include decreasing portion sizes, encouraging healthy choices of fruits and vegetables and consuming healthier snacks  Exercise recommendations include exercising 3-5 times per week  I have spent 30 minutes with Patient and family today in which greater than 50% of this time was spent in counseling/coordination of care regarding Intructions for management, Patient and family education and Importance of tx compliance  Diagnoses and all orders for this visit:    Chronic right-sided low back pain without sciatica  -     Ambulatory referral to Physical Therapy; Future    Atrial fibrillation, unspecified type (HCC)  -     warfarin (COUMADIN) 5 mg tablet; Take 1 tablet (5 mg total) by mouth daily    Essential hypertension  -     metoprolol tartrate (LOPRESSOR) 25 mg tablet;  Take 1 tablet (25 mg total) by mouth 2 (two) times a day  -     benazepril (LOTENSIN) 10 mg tablet; Take 1 tablet (10 mg total) by mouth daily  -     amLODIPine (NORVASC) 5 mg tablet; Take 1 tablet (5 mg total) by mouth daily    Mixed hyperlipidemia  -     atorvastatin (LIPITOR) 10 mg tablet; Take 1 tablet (10 mg total) by mouth daily          Subjective:      Patient ID: Rose Neff is a 80 y o  male  This is the case of a 80year old white male with a PMHx of SOB, chronic renal failure, hypertension, and A-fib, presenting in the office for a follow up for ambulatory dysfunction  He has been experiencing back and hip pain  Patient states he has been going to physical therapy for this and his has helped improve his symptoms  Lab results were reviewed and discussed with patient  The following portions of the patient's history were reviewed and updated as appropriate: allergies, current medications, past family history, past medical history, past social history, past surgical history and problem list     Review of Systems   Constitutional: Negative for diaphoresis, fatigue, fever and unexpected weight change  HENT: Positive for postnasal drip, rhinorrhea and sneezing  Negative for congestion  Improved with flonase   Eyes: Positive for itching  Negative for visual disturbance  Respiratory: Positive for cough  Negative for chest tightness and shortness of breath  Cardiovascular: Positive for leg swelling  Negative for chest pain and palpitations  Gastrointestinal: Negative for abdominal distention, abdominal pain, blood in stool, constipation, diarrhea and nausea  Genitourinary: Positive for difficulty urinating, frequency and urgency  Negative for hematuria  Musculoskeletal: Positive for arthralgias, back pain and gait problem  Negative for myalgias and neck pain  Back pain improved with PT   Skin: Negative  Neurological: Negative for dizziness, light-headedness, numbness and headaches     Hematological: Negative for adenopathy  Psychiatric/Behavioral: Negative  Objective:      /60 (BP Location: Left arm, Patient Position: Sitting, Cuff Size: Standard)   Pulse 64   Temp (!) 97 1 °F (36 2 °C) (Tympanic)   Ht 6' (1 829 m) Comment: shoes on  Wt 109 kg (241 lb) Comment: shoes on  SpO2 98%   BMI 32 69 kg/m²          Physical Exam   Constitutional: He appears well-developed and well-nourished  No distress  HENT:   Head: Normocephalic and atraumatic  Right Ear: External ear normal    Left Ear: External ear normal    Eyes: EOM are normal  Right eye exhibits no discharge  Left eye exhibits no discharge  No scleral icterus  Neck: Neck supple  No JVD present  Cardiovascular: Exam reveals no gallop  Murmur heard  irreg  murmur   Pulmonary/Chest: Effort normal and breath sounds normal  No respiratory distress  He has no wheezes  He has no rales  He exhibits no tenderness  Abdominal: Soft  Bowel sounds are normal  He exhibits no distension  There is no tenderness  There is no rebound and no guarding  Musculoskeletal: He exhibits edema (trace)  He exhibits no tenderness or deformity  Neurological: He is alert  He displays normal reflexes  Skin: Skin is dry  No rash noted  He is not diaphoretic  No erythema  Nursing note and vitals reviewed        Allergies   Allergen Reactions    Penicillins Blisters     Past Medical History:   Diagnosis Date    Atrial fibrillation ([facility] )     Hypertension     Melanoma ([facility] )     Prostate cancer ([facility] )     Sleep apnea      Current Outpatient Medications on File Prior to Visit   Medication Sig Dispense Refill    aspirin 81 mg chewable tablet Chew 81 mg daily        B COMPLEX VITAMINS ER PO Take 1 tablet by mouth daily        Cholecalciferol (VITAMIN D) 2000 units CAPS Take 1 capsule (2,000 Units total) by mouth daily 60 capsule 3    fexofenadine (ALLEGRA) 180 MG tablet Take 1 tablet (180 mg total) by mouth daily 15 tablet 0    fluticasone (FLONASE) 50 mcg/act nasal spray 1 spray into each nostril daily 1 Bottle 0    levothyroxine 75 mcg tablet Take 1 tablet (75 mcg total) by mouth daily 90 tablet 1    oxybutynin (DITROPAN-XL) 5 mg 24 hr tablet Take 1 tablet (5 mg total) by mouth daily 30 tablet 10    [DISCONTINUED] amLODIPine (NORVASC) 5 mg tablet Take 1 tablet (5 mg total) by mouth daily 90 tablet 1    [DISCONTINUED] atorvastatin (LIPITOR) 10 mg tablet Take 1 tablet (10 mg total) by mouth daily 90 tablet 1    [DISCONTINUED] benazepril (LOTENSIN) 10 mg tablet Take 1 tablet (10 mg total) by mouth daily 90 tablet 1    [DISCONTINUED] metoprolol tartrate (LOPRESSOR) 25 mg tablet Take 1 tablet (25 mg total) by mouth 2 (two) times a day 180 tablet 1    [DISCONTINUED] warfarin (COUMADIN) 5 mg tablet Take 1 tablet (5 mg total) by mouth daily 90 tablet 1    albuterol (PROVENTIL HFA,VENTOLIN HFA) 90 mcg/act inhaler Inhale 2 puffs every 6 (six) hours as needed for wheezing or shortness of breath (Patient not taking: Reported on 10/22/2019) 1 Inhaler 5    diclofenac sodium (VOLTAREN) 1 % Apply 4 g topically 4 (four) times a day (Patient not taking: Reported on 10/22/2019) 1 Tube 2     No current facility-administered medications on file prior to visit  Attestation:   By signing my name below, Darlene Bowles, attest that this documentation has been prepared under the direction and in the presence of Ephraim Hood MD  Electronically Signed: April Zelaya, [address] [place] [address]  10/22/19      I, Ephraim Hood, personally performed the services described in this documentation  All medical record entries made by the scribe were at my direction and in my presence  I have reviewed the chart and discharge instructions and agree that the record reflects my personal performance and is accurate and complete   Ephraim Hood MD  10/22/19

## 2019-10-22 NOTE — PATIENT INSTRUCTIONS
1  Receive Tdap in the office today  2  Schedule Medicare annual wellness visit  3  Continue to go to PT for back and hip pain  4  Try taking a paraffin bath for arthritis and joint pain in hands  5  Complete TSH blood work before next visit  6  Suggest RAJAN  GOV for more information and knowledge on your health  7  Follow up after trip in April

## 2020-01-01 ENCOUNTER — TELEMEDICINE (OUTPATIENT)
Dept: INTERNAL MEDICINE CLINIC | Age: 85
End: 2020-01-01
Payer: MEDICARE

## 2020-01-01 ENCOUNTER — TELEPHONE (OUTPATIENT)
Dept: INTERNAL MEDICINE CLINIC | Facility: CLINIC | Age: 85
End: 2020-01-01

## 2020-01-01 ENCOUNTER — OFFICE VISIT (OUTPATIENT)
Dept: INTERNAL MEDICINE CLINIC | Age: 85
End: 2020-01-01
Payer: MEDICARE

## 2020-01-01 ENCOUNTER — CLINICAL SUPPORT (OUTPATIENT)
Dept: INTERNAL MEDICINE CLINIC | Age: 85
End: 2020-01-01
Payer: MEDICARE

## 2020-01-01 ENCOUNTER — TELEMEDICINE (OUTPATIENT)
Dept: UROLOGY | Facility: CLINIC | Age: 85
End: 2020-01-01
Payer: MEDICARE

## 2020-01-01 ENCOUNTER — TELEPHONE (OUTPATIENT)
Dept: INTERNAL MEDICINE CLINIC | Age: 85
End: 2020-01-01

## 2020-01-01 VITALS — SYSTOLIC BLOOD PRESSURE: 132 MMHG | WEIGHT: 241 LBS | BODY MASS INDEX: 32.69 KG/M2 | DIASTOLIC BLOOD PRESSURE: 80 MMHG

## 2020-01-01 VITALS
OXYGEN SATURATION: 98 % | WEIGHT: 241 LBS | HEART RATE: 96 BPM | HEIGHT: 72 IN | BODY MASS INDEX: 32.64 KG/M2 | TEMPERATURE: 97.9 F | DIASTOLIC BLOOD PRESSURE: 80 MMHG | SYSTOLIC BLOOD PRESSURE: 140 MMHG

## 2020-01-01 DIAGNOSIS — L30.9 DERMATITIS: Primary | ICD-10-CM

## 2020-01-01 DIAGNOSIS — E78.2 MIXED HYPERLIPIDEMIA: ICD-10-CM

## 2020-01-01 DIAGNOSIS — I10 ESSENTIAL HYPERTENSION: Primary | ICD-10-CM

## 2020-01-01 DIAGNOSIS — Z23 NEED FOR INFLUENZA VACCINATION: Primary | ICD-10-CM

## 2020-01-01 DIAGNOSIS — I10 ESSENTIAL HYPERTENSION: ICD-10-CM

## 2020-01-01 DIAGNOSIS — M51.36 DDD (DEGENERATIVE DISC DISEASE), LUMBAR: ICD-10-CM

## 2020-01-01 DIAGNOSIS — E78.5 DYSLIPIDEMIA: ICD-10-CM

## 2020-01-01 DIAGNOSIS — N32.81 OAB (OVERACTIVE BLADDER): ICD-10-CM

## 2020-01-01 DIAGNOSIS — Z00.00 ENCOUNTER FOR ANNUAL WELLNESS EXAM IN MEDICARE PATIENT: Primary | ICD-10-CM

## 2020-01-01 DIAGNOSIS — E03.9 HYPOTHYROIDISM, UNSPECIFIED TYPE: ICD-10-CM

## 2020-01-01 DIAGNOSIS — C61 PROSTATE CANCER (HCC): Primary | ICD-10-CM

## 2020-01-01 DIAGNOSIS — L30.9 DERMATITIS: ICD-10-CM

## 2020-01-01 DIAGNOSIS — I48.0 PAROXYSMAL ATRIAL FIBRILLATION (HCC): ICD-10-CM

## 2020-01-01 DIAGNOSIS — I48.91 ATRIAL FIBRILLATION, UNSPECIFIED TYPE (HCC): ICD-10-CM

## 2020-01-01 PROCEDURE — 99214 OFFICE O/P EST MOD 30 MIN: CPT | Performed by: PHYSICIAN ASSISTANT

## 2020-01-01 PROCEDURE — 99213 OFFICE O/P EST LOW 20 MIN: CPT | Performed by: PHYSICIAN ASSISTANT

## 2020-01-01 PROCEDURE — 3008F BODY MASS INDEX DOCD: CPT | Performed by: PHYSICIAN ASSISTANT

## 2020-01-01 PROCEDURE — 3079F DIAST BP 80-89 MM HG: CPT | Performed by: PHYSICIAN ASSISTANT

## 2020-01-01 PROCEDURE — G0008 ADMIN INFLUENZA VIRUS VAC: HCPCS

## 2020-01-01 PROCEDURE — 3077F SYST BP >= 140 MM HG: CPT | Performed by: PHYSICIAN ASSISTANT

## 2020-01-01 PROCEDURE — 4040F PNEUMOC VAC/ADMIN/RCVD: CPT | Performed by: PHYSICIAN ASSISTANT

## 2020-01-01 PROCEDURE — G0439 PPPS, SUBSEQ VISIT: HCPCS | Performed by: PHYSICIAN ASSISTANT

## 2020-01-01 PROCEDURE — 1160F RVW MEDS BY RX/DR IN RCRD: CPT | Performed by: PHYSICIAN ASSISTANT

## 2020-01-01 PROCEDURE — 1036F TOBACCO NON-USER: CPT | Performed by: PHYSICIAN ASSISTANT

## 2020-01-01 PROCEDURE — 3075F SYST BP GE 130 - 139MM HG: CPT | Performed by: PHYSICIAN ASSISTANT

## 2020-01-01 PROCEDURE — 90662 IIV NO PRSV INCREASED AG IM: CPT

## 2020-01-01 RX ORDER — ATORVASTATIN CALCIUM 10 MG/1
TABLET, FILM COATED ORAL
Qty: 90 TABLET | Refills: 1 | Status: SHIPPED | OUTPATIENT
Start: 2020-01-01

## 2020-01-01 RX ORDER — TRIAMCINOLONE ACETONIDE 1 MG/G
CREAM TOPICAL
COMMUNITY
Start: 2020-01-01

## 2020-01-01 RX ORDER — WARFARIN SODIUM 5 MG/1
TABLET ORAL
Qty: 90 TABLET | Refills: 1 | Status: SHIPPED | OUTPATIENT
Start: 2020-01-01

## 2020-01-01 RX ORDER — LEVOTHYROXINE SODIUM 0.07 MG/1
TABLET ORAL
Qty: 90 TABLET | Refills: 1 | Status: SHIPPED | OUTPATIENT
Start: 2020-01-01 | End: 2020-01-01

## 2020-01-01 RX ORDER — LEVOTHYROXINE SODIUM 0.07 MG/1
TABLET ORAL
Qty: 90 TABLET | Refills: 1 | Status: SHIPPED | OUTPATIENT
Start: 2020-01-01

## 2020-01-01 RX ORDER — AMLODIPINE BESYLATE 5 MG/1
TABLET ORAL
Qty: 90 TABLET | Refills: 1 | Status: SHIPPED | OUTPATIENT
Start: 2020-01-01 | End: 2020-01-01

## 2020-01-01 RX ORDER — OXYBUTYNIN CHLORIDE 5 MG/1
TABLET, EXTENDED RELEASE ORAL
Qty: 30 TABLET | Refills: 10 | Status: SHIPPED | OUTPATIENT
Start: 2020-01-01

## 2020-01-01 RX ORDER — ATORVASTATIN CALCIUM 10 MG/1
TABLET, FILM COATED ORAL
Qty: 90 TABLET | Refills: 1 | Status: SHIPPED | OUTPATIENT
Start: 2020-01-01 | End: 2020-01-01

## 2020-01-01 RX ORDER — AMLODIPINE BESYLATE 5 MG/1
TABLET ORAL
Qty: 90 TABLET | Refills: 1 | Status: SHIPPED | OUTPATIENT
Start: 2020-01-01

## 2020-07-17 NOTE — PROGRESS NOTES
Virtual Regular Visit      Assessment:    1  Prostate cancer  2  Urinary incontinence    Plan:  -continue oxybutynin as ordered  -follow-up in 1 year for his annual exam    Problem List Items Addressed This Visit     None               Reason for visit is:  Prostate cancer and incontinence follow-up   Chief Complaint   Patient presents with    Virtual Regular Visit        Encounter provider Dafne Gilbert PA-C    Provider located at [address] [place] [id number]      Recent Visits  No visits were found meeting these conditions  Showing recent visits within past 7 days and meeting all other requirements     Today's Visits  Date Type Provider Dept   07/17/20 Telemedicine Dafne Gilbert PA-C [facility] today's visits and meeting all other requirements     Future Appointments  No visits were found meeting these conditions  Showing future appointments within next 150 days and meeting all other requirements        The patient was identified by name and date of birth  Alecia Martino was informed that this is a telemedicine visit and that the visit is being conducted through [address] [place] [address] and patient was informed that this is not a secure, HIPAA-complaint platform  He agrees to proceed     My office door was closed  No one else was in the room  He acknowledged consent and understanding of privacy and security of the video platform  The patient has agreed to participate and understands they can discontinue the visit at any time  Patient is aware this is a billable service  Subjective  Alecia Martino is a 80 y o  male with history of [place] 7 prostate cancer  He was treated with radiation in 2007  There is no recent PSA  He has been on oxybutynin for urge urinary incontinence and does wear a pad  He reports improvement of his symptoms on the medication  He has no weight loss or bone pain  Ledy Keen       HPI Past Medical History:   Diagnosis Date    Atrial fibrillation ([facility] )     Hypertension     Hypothyroidism     Melanoma ([facility] )     Prostate cancer ([place] [address] )     Sleep apnea        Past Surgical History:   Procedure Laterality Date    CARDIAC SURGERY  1996    CHOLECYSTECTOMY      REPLACEMENT TOTAL KNEE Left     SKIN CANCER EXCISION         Current Outpatient Medications   Medication Sig Dispense Refill    albuterol (PROVENTIL HFA,VENTOLIN HFA) 90 mcg/act inhaler Inhale 2 puffs every 6 (six) hours as needed for wheezing or shortness of breath (Patient not taking: Reported on 10/22/2019) 1 Inhaler 5    aspirin 81 mg chewable tablet Chew 81 mg daily        atorvastatin (LIPITOR) 10 mg tablet TAKE 1 TABLET(10 MG) BY MOUTH DAILY 90 tablet 1    B COMPLEX VITAMINS ER PO Take 1 tablet by mouth daily        Cholecalciferol (VITAMIN D) 2000 units CAPS Take 1 capsule (2,000 Units total) by mouth daily 60 capsule 3    diclofenac sodium (VOLTAREN) 1 % Apply 4 g topically 4 (four) times a day (Patient not taking: Reported on 10/22/2019) 1 Tube 2    fexofenadine (ALLEGRA) 180 MG tablet Take 1 tablet (180 mg total) by mouth daily 15 tablet 0    fluticasone (FLONASE) 50 mcg/act nasal spray 1 spray into each nostril daily 1 Bottle 0    levothyroxine 75 mcg tablet TAKE 1 TABLET(75 MCG) BY MOUTH DAILY 90 tablet 1    metoprolol tartrate (LOPRESSOR) 25 mg tablet Take 1 tablet (25 mg total) by mouth 2 (two) times a day 180 tablet 1    triamcinolone (KENALOG) 0 1 % cream APPLY BID TO ITCHY AREAS AS DIRECTED      warfarin (COUMADIN) 5 mg tablet Take 1 tablet (5 mg total) by mouth daily 90 tablet 1     No current facility-administered medications for this visit  Allergies   Allergen Reactions    Penicillins Blisters       Review of Systems Genito-Urinary ROS: no dysuria, trouble voiding, or hematuria    Video Exam    There were no vitals filed for this visit      Physical Exam     I spent 15 minutes with patient today in which greater than 50% of the time was spent in counseling/coordination of care regarding Prostate cancer and urge incontinence      VIRTUAL VISIT [address] acknowledges that he has consented to an online visit or consultation  He understands that the online visit is based solely on information provided by him, and that, in the absence of a face-to-face physical evaluation by the physician, the diagnosis he receives is both limited and provisional in terms of accuracy and completeness  This is not intended to replace a full medical face-to-face evaluation by the physician  Ebony Lovelace understands and accepts these terms

## 2020-08-11 NOTE — TELEPHONE ENCOUNTER
Received medical records request from [facility] on 8/11/2020, [facility] on 8/11/2020

## 2020-08-24 NOTE — TELEPHONE ENCOUNTER
[address] received fax sent 8/20/20 for physician orders for DME for patient  However, forms were missing diagnosis code and attached medical records  If appropriate, please update diagnosis code and resend to [facility] with attached medical records

## 2020-09-11 NOTE — TELEPHONE ENCOUNTER
[facility] request, [facility] 09/11/[facility]

## 2025-03-30 NOTE — PROGRESS NOTES
UROLOGY PROGRESS NOTE   Patient Identifiers: Marek Ko (MRN [id number])  Date of Service: 9/17/2019    Subjective:      80-year-old man with a history of [place] 6 stage II prostate cancer  He was treated with radiation therapy in 2007  His last PSA was 0 6  He comes in complaining of urinary urgency frequency and leakage  He gets up 3-4 times per night and uses several pads per day  He has no burning no hematuria no fever  He has tried condom catheters  However,due to small anatomy, patient is not able to use condom catheters as they pop-offs      Patient has    As above      Objective:     VITALS:    Vitals:    09/17/19 1408   BP: 136/70   Pulse: 63           LABS:  Lab Results   Component Value Date    HGB 15 5 06/10/2015    HCT 45 3 06/10/2015    WBC 5 68 06/10/2015     (L) 06/10/2015   ]    Lab Results   Component Value Date     07/28/2015    K 4 3 10/25/2018     10/25/2018    CO2 25 10/25/2018    BUN 23 10/25/2018    CREATININE 1 35 (H) 10/25/2018    CALCIUM 8 9 10/25/2018    GLUCOSE 108 07/28/2015   ]        INPATIENT MEDS:    Current Outpatient Medications:     albuterol (PROVENTIL HFA,VENTOLIN HFA) 90 mcg/act inhaler, Inhale 2 puffs every 6 (six) hours as needed for wheezing or shortness of breath, Disp: 1 Inhaler, Rfl: 5    amLODIPine (NORVASC) 5 mg tablet, Take 1 tablet (5 mg total) by mouth daily, Disp: 90 tablet, Rfl: 1    aspirin 81 mg chewable tablet, Chew 81 mg daily  , Disp: , Rfl:     atorvastatin (LIPITOR) 10 mg tablet, Take 1 tablet (10 mg total) by mouth daily, Disp: 90 tablet, Rfl: 1    B COMPLEX VITAMINS ER PO, Take 1 tablet by mouth daily  , Disp: , Rfl:     benazepril (LOTENSIN) 10 mg tablet, Take 1 tablet (10 mg total) by mouth daily, Disp: 90 tablet, Rfl: 1    Cholecalciferol (VITAMIN D) 2000 units CAPS, Take 1 capsule (2,000 Units total) by mouth daily, Disp: 60 capsule, Rfl: 3    diclofenac sodium (VOLTAREN) 1 %, Apply 4 g topically 4 (four) times a unchanged day, Disp: 1 Tube, Rfl: 2    fexofenadine (ALLEGRA) 180 MG tablet, Take 1 tablet (180 mg total) by mouth daily, Disp: 15 tablet, Rfl: 0    fluticasone (FLONASE) 50 mcg/act nasal spray, 1 spray into each nostril daily, Disp: 1 Bottle, Rfl: 0    levothyroxine 50 mcg tablet, Take 1 tablet (50 mcg total) by mouth daily, Disp: 90 tablet, Rfl: 1    metoprolol tartrate (LOPRESSOR) 25 mg tablet, Take 1 tablet (25 mg total) by mouth 2 (two) times a day, Disp: 180 tablet, Rfl: 1    oxybutynin (DITROPAN-XL) 5 mg 24 hr tablet, Take 1 tablet (5 mg total) by mouth daily, Disp: 30 tablet, Rfl: 10    warfarin (COUMADIN) 5 mg tablet, Take 1 tablet (5 mg total) by mouth daily, Disp: 90 tablet, Rfl: 1      Physical Exam:   /70 (BP Location: Left arm, Patient Position: Sitting, Cuff Size: Adult)   Pulse 63   Ht 6' (1 829 m)   Wt 110 kg (242 lb)   BMI 32 82 kg/m²   GEN: no acute distress    RESP: breathing comfortably with no accessory muscle use    ABD: soft, non-tender, non-distended   INCISION:    EXT: no significant peripheral edema     RADIOLOGY:    none     Assessment:    1  Urinary incontinence   2   History of prostate cancer     Plan:   - recommend scheduling urodynamic testing and cystoscopy  - we discussed the possible application of intravesical Botox-  -